# Patient Record
Sex: FEMALE | Race: BLACK OR AFRICAN AMERICAN | Employment: UNEMPLOYED | URBAN - METROPOLITAN AREA
[De-identification: names, ages, dates, MRNs, and addresses within clinical notes are randomized per-mention and may not be internally consistent; named-entity substitution may affect disease eponyms.]

---

## 2023-02-07 ENCOUNTER — PREP FOR PROCEDURE (OUTPATIENT)
Dept: GASTROENTEROLOGY | Age: 58
End: 2023-02-07

## 2023-02-07 RX ORDER — SODIUM CHLORIDE 0.9 % (FLUSH) 0.9 %
5-40 SYRINGE (ML) INJECTION EVERY 12 HOURS SCHEDULED
Status: CANCELLED | OUTPATIENT
Start: 2023-02-07

## 2023-02-07 RX ORDER — SODIUM CHLORIDE 9 MG/ML
25 INJECTION, SOLUTION INTRAVENOUS PRN
Status: CANCELLED | OUTPATIENT
Start: 2023-02-07

## 2023-02-07 RX ORDER — SODIUM CHLORIDE 0.9 % (FLUSH) 0.9 %
5-40 SYRINGE (ML) INJECTION PRN
Status: CANCELLED | OUTPATIENT
Start: 2023-02-07

## 2023-03-09 PROBLEM — Z12.11 ENCOUNTER FOR SCREENING COLONOSCOPY: Status: RESOLVED | Noted: 2023-02-07 | Resolved: 2023-03-09

## 2023-03-13 NOTE — PRE-PROCEDURE INSTRUCTIONS
Patient verified name, , and procedure. Type: 1a; abbreviated assessment per anesthesia guidelines    Labs per anesthesia: none    Instructed pt that they will be notified the day before their procedure by the GI Lab for time of arrival if their procedure is [facility] and Pre-op for [place] cases. Arrival times should be called by 5 pm. If no phone is received the patient should contact their respective hospital. The GI lab telephone number is [phone] and ES Pre-op is [phone]. Follow diet and prep instructions per office including NPO status. If patient has NOT received instructions from office patient is advised to call surgeon office, verbalizes understanding. Bath or shower the night before and the am of surgery with non-moisturizing soap. No lotions, oils, powders, cologne on skin. No make up, eye make up or jewelry. Wear loose fitting comfortable, clean clothing. Must have adult present in building the entire time . Medication instructions given per Dr. Jamilah Morris for the day of procedure. The following discharge instructions reviewed with patient: medication given during procedure may cause drowsiness for several hours, therefore, do not drive or operate machinery for remainder of the day. You may not drink alcohol on the day of your procedure, please resume regular diet and activity unless otherwise directed. You may experience abdominal distention for several hours that is relieved by the passage of gas. Contact your physician if you have any of the following: fever or chills, severe abdominal pain or excessive amount of bleeding or a large amount when having a bowel movement.  Occasional specks of blood with bowel movement would not be unusual.

## 2023-03-24 NOTE — PROGRESS NOTES
Left generic message on non self identifying voicemail for patient to return call to the GI lab. [phone]

## 2023-03-27 ENCOUNTER — HOSPITAL ENCOUNTER (OUTPATIENT)
Age: 58
Setting detail: OUTPATIENT SURGERY
Discharge: HOME | End: 2023-03-27
Attending: INTERNAL MEDICINE | Admitting: INTERNAL MEDICINE
Payer: MEDICAID

## 2023-03-27 ENCOUNTER — ANESTHESIA EVENT (OUTPATIENT)
Dept: ENDOSCOPY | Age: 58
End: 2023-03-27

## 2023-03-27 ENCOUNTER — ANESTHESIA (OUTPATIENT)
Dept: ENDOSCOPY | Age: 58
End: 2023-03-27

## 2023-03-27 VITALS
HEART RATE: 53 BPM | TEMPERATURE: 98 F | BODY MASS INDEX: 44.16 KG/M2 | WEIGHT: 240 LBS | RESPIRATION RATE: 16 BRPM | OXYGEN SATURATION: 98 % | SYSTOLIC BLOOD PRESSURE: 120 MMHG | HEIGHT: 62 IN | DIASTOLIC BLOOD PRESSURE: 65 MMHG

## 2023-03-27 DIAGNOSIS — Z12.11 ENCOUNTER FOR SCREENING COLONOSCOPY: ICD-10-CM

## 2023-03-27 PROCEDURE — 3700000001 HC ADD 15 MINUTES (ANESTHESIA): Performed by: INTERNAL MEDICINE

## 2023-03-27 PROCEDURE — 3609010600 HC COLONOSCOPY POLYPECTOMY SNARE/COLD BIOPSY: Performed by: INTERNAL MEDICINE

## 2023-03-27 PROCEDURE — 88305 TISSUE EXAM BY PATHOLOGIST: CPT

## 2023-03-27 PROCEDURE — 7100000011 HC PHASE II RECOVERY - ADDTL 15 MIN: Performed by: INTERNAL MEDICINE

## 2023-03-27 PROCEDURE — 2580000003 HC RX 258: Performed by: ANESTHESIOLOGY

## 2023-03-27 PROCEDURE — 3700000000 HC ANESTHESIA ATTENDED CARE: Performed by: INTERNAL MEDICINE

## 2023-03-27 PROCEDURE — 2709999900 HC NON-CHARGEABLE SUPPLY: Performed by: INTERNAL MEDICINE

## 2023-03-27 PROCEDURE — 6360000002 HC RX W HCPCS: Performed by: NURSE ANESTHETIST, CERTIFIED REGISTERED

## 2023-03-27 PROCEDURE — 7100000010 HC PHASE II RECOVERY - FIRST 15 MIN: Performed by: INTERNAL MEDICINE

## 2023-03-27 PROCEDURE — 45385 COLONOSCOPY W/LESION REMOVAL: CPT | Performed by: INTERNAL MEDICINE

## 2023-03-27 RX ORDER — LIDOCAINE HYDROCHLORIDE 10 MG/ML
1 INJECTION, SOLUTION INFILTRATION; PERINEURAL
Status: DISCONTINUED | OUTPATIENT
Start: 2023-03-27 | End: 2023-03-27 | Stop reason: HOSPADM

## 2023-03-27 RX ORDER — SODIUM CHLORIDE 9 MG/ML
INJECTION, SOLUTION INTRAVENOUS PRN
Status: DISCONTINUED | OUTPATIENT
Start: 2023-03-27 | End: 2023-03-27 | Stop reason: HOSPADM

## 2023-03-27 RX ORDER — SODIUM CHLORIDE, SODIUM LACTATE, POTASSIUM CHLORIDE, CALCIUM CHLORIDE 600; 310; 30; 20 MG/100ML; MG/100ML; MG/100ML; MG/100ML
INJECTION, SOLUTION INTRAVENOUS CONTINUOUS
Status: DISCONTINUED | OUTPATIENT
Start: 2023-03-27 | End: 2023-03-27 | Stop reason: HOSPADM

## 2023-03-27 RX ORDER — SODIUM CHLORIDE 0.9 % (FLUSH) 0.9 %
5-40 SYRINGE (ML) INJECTION EVERY 12 HOURS SCHEDULED
Status: DISCONTINUED | OUTPATIENT
Start: 2023-03-27 | End: 2023-03-27 | Stop reason: HOSPADM

## 2023-03-27 RX ORDER — PROPOFOL 10 MG/ML
INJECTION, EMULSION INTRAVENOUS PRN
Status: DISCONTINUED | OUTPATIENT
Start: 2023-03-27 | End: 2023-03-27 | Stop reason: SDUPTHER

## 2023-03-27 RX ORDER — SODIUM CHLORIDE 0.9 % (FLUSH) 0.9 %
5-40 SYRINGE (ML) INJECTION PRN
Status: DISCONTINUED | OUTPATIENT
Start: 2023-03-27 | End: 2023-03-27 | Stop reason: HOSPADM

## 2023-03-27 RX ADMIN — PROPOFOL 25 MG: 10 INJECTION, EMULSION INTRAVENOUS at 08:19

## 2023-03-27 RX ADMIN — PROPOFOL 50 MG: 10 INJECTION, EMULSION INTRAVENOUS at 08:12

## 2023-03-27 RX ADMIN — SODIUM CHLORIDE, SODIUM LACTATE, POTASSIUM CHLORIDE, AND CALCIUM CHLORIDE: 600; 310; 30; 20 INJECTION, SOLUTION INTRAVENOUS at 07:09

## 2023-03-27 RX ADMIN — PROPOFOL 140 MCG/KG/MIN: 10 INJECTION, EMULSION INTRAVENOUS at 08:13

## 2023-03-27 RX ADMIN — PROPOFOL 25 MG: 10 INJECTION, EMULSION INTRAVENOUS at 08:16

## 2023-03-27 SDOH — ECONOMIC STABILITY: INCOME INSECURITY: HOW HARD IS IT FOR YOU TO PAY FOR THE VERY BASICS LIKE FOOD, HOUSING, MEDICAL CARE, AND HEATING?: NOT HARD AT ALL

## 2023-03-27 SDOH — ECONOMIC STABILITY: FOOD INSECURITY: WITHIN THE PAST 12 MONTHS, THE FOOD YOU BOUGHT JUST DIDN'T LAST AND YOU DIDN'T HAVE MONEY TO GET MORE.: NEVER TRUE

## 2023-03-27 SDOH — ECONOMIC STABILITY: TRANSPORTATION INSECURITY
IN THE PAST 12 MONTHS, HAS LACK OF TRANSPORTATION KEPT YOU FROM MEETINGS, WORK, OR FROM GETTING THINGS NEEDED FOR DAILY LIVING?: NO

## 2023-03-27 SDOH — ECONOMIC STABILITY: HOUSING INSECURITY
IN THE LAST 12 MONTHS, WAS THERE A TIME WHEN YOU DID NOT HAVE A STEADY PLACE TO SLEEP OR SLEPT IN A SHELTER (INCLUDING NOW)?: NO

## 2023-03-27 SDOH — ECONOMIC STABILITY: FOOD INSECURITY: WITHIN THE PAST 12 MONTHS, YOU WORRIED THAT YOUR FOOD WOULD RUN OUT BEFORE YOU GOT MONEY TO BUY MORE.: NEVER TRUE

## 2023-03-27 ASSESSMENT — ENCOUNTER SYMPTOMS
SHORTNESS OF BREATH: 0
VOMITING: 0
TROUBLE SWALLOWING: 0
COLOR CHANGE: 0
ABDOMINAL PAIN: 0
BLOOD IN STOOL: 0

## 2023-03-27 ASSESSMENT — PAIN - FUNCTIONAL ASSESSMENT: PAIN_FUNCTIONAL_ASSESSMENT: NONE - DENIES PAIN

## 2023-03-27 NOTE — H&P
No focal deficit present. Mental Status: She is alert. Current Facility-Administered Medications   Medication Dose Route Frequency Provider Last Rate Last Admin    lidocaine 1 % injection 1 mL  1 mL IntraDERmal Once PRN Matilde Figueroa MD        lactated ringers IV soln infusion   IntraVENous Continuous Matilde Figueroa MD        sodium chloride flush 0.9 % injection 5-40 mL  5-40 mL IntraVENous 2 times per day Matilde Figueroa MD        sodium chloride flush 0.9 % injection 5-40 mL  5-40 mL IntraVENous PRN Matilde Figueroa MD        0.9 % sodium chloride infusion   IntraVENous PRN Matilde Figueroa MD           Family History   Problem Relation Age of Onset    Hypertension Mother     No Known Problems Father     Hypertension Sister     Hypertension Brother        No follow-ups on file. An electronic signature was used to authenticate this note.     --Melinda Griffin MD

## 2023-03-27 NOTE — DISCHARGE INSTRUCTIONS
Gastrointestinal Colonoscopy/Flexible Sigmoidoscopy - Lower Exam Discharge Instructions  Call Dr. Ron Hearn at [phone] for any problems or questions. Contact the doctors office for follow up appointment as directed  Medication may cause drowsiness for several hours, therefore:  Do not drive or operate machinery for reminder of the day. No alcohol today. Do not make any important or legal decisions for 24 hours. Do not sign any legal documents for 24 hours. Ordinarily, you may resume regular diet and activity after exam unless otherwise specified by your physician. Because of air put into your colon during exam, you may experience some abdominal distension, relieved by the passage of gas, for several hours. Contact your physician if you have any of the following:  Excessive amount of bleeding - large amount when having a bowel movement. Occasional specks of blood with bowel movement would not be unusual.  Severe abdominal pain  Fever or Chills  Any additional instructions: Follow up with office for pathology results. Office will determine next colonoscopy schedule based on path results.

## 2023-03-27 NOTE — PROGRESS NOTES
Discharge instructions were reviewed with patient and friend. An opportunity was given for questions. Patient and friend verbalized understanding, and has no questions at this time.

## 2023-03-27 NOTE — ANESTHESIA POSTPROCEDURE EVALUATION
Department of Anesthesiology  Postprocedure Note    Patient: Antonio Jacobs  MRN: [id number]  YOB: 1965  Date of evaluation: 3/27/2023      Procedure Summary     Date: 03/27/23 Room / Location: [facility] ENDO 04 / [facility] ENDOSCOPY    Anesthesia Start: 0801 Anesthesia Stop: 5238    Procedure: COLONOSCOPY/ POLYPECTOMY (Lower GI Region) Diagnosis:       Encounter for screening colonoscopy      (Encounter for screening colonoscopy [Z12.11])    Surgeons: Faustino Jaquez MD Responsible Provider: Zakiya Manuel MD    Anesthesia Type: TIVA ASA Status: 3          Anesthesia Type: No value filed.     Claudine Phase I: Claudine Score: 10    Claudine Phase II: Claudine Score: 8      Anesthesia Post Evaluation    Patient location during evaluation: PACU  Patient participation: complete - patient participated  Level of consciousness: awake and alert  Airway patency: patent  Nausea & Vomiting: no nausea and no vomiting  Complications: no  Cardiovascular status: hemodynamically stable  Respiratory status: acceptable, nonlabored ventilation and spontaneous ventilation  Hydration status: euvolemic  Comments: BP (!) 114/55   Pulse 80   Temp 98 °F (36.7 °C) (Skin)   Resp 16   Ht 5' 2\" (1.575 m)   Wt 240 lb (108.9 kg)   SpO2 97%   BMI 43.90 kg/m²     Multimodal analgesia pain management approach

## 2023-03-27 NOTE — PROCEDURES
Operative Report    Patient: Pretty Ann MRN: [id number]      YOB: 1965  Age: 66 y.o. Sex: female            Indications:  screening for colon cancer [unfilled]     Preoperative Evaluation: The patient was evaluated prior to the procedure in the GI lab admission area, the patient ASA was recorded . Consent was obtained from the patient with the risk of perforation bleeding and aspiration. Anesthesia: PEEWEE-per anesthesia    Complications: None; patient tolerated the procedure well. EBL -insignificant      Procedure: The patient was sedated in the left lateral decubitus position. Scope was advanced from the rectum to the cecum. Evaluation was performed to the cecum twice. The scope was withdrawn to the rectum, retroflexed view was performed. The rectal exam was normal.  Preparation was good Harrison score of 3/3/3:9 . Findings:   Exam to the cecum. 2 passes performed into the right colon. Normal cecum ascending transverse descending mucosa 5 mm sigmoid polyp that was cold snare. Normal rectal mucosa. External hemorrhoid noted      Postoperative Diagnosis: Sigmoid polyp    [address]26 Colonoscopy, Flexible; with removal of tumor(s), polyp(s), or other lesion(s) by snare technique      Recommendations:   - If adenoma is present, repeat colonoscopy in 5 years.       Signed By:  Odette Caraballo MD     March 27, 2023

## 2023-03-27 NOTE — ANESTHESIA PRE PROCEDURE
12:11 PM    PROT 7.1 12/15/2022 12:11 PM    CALCIUM 9.3 12/15/2022 12:11 PM    BILITOT 0.6 12/15/2022 12:11 PM    ALKPHOS 62 12/15/2022 12:11 PM    AST 11 12/15/2022 12:11 PM    ALT 25 12/15/2022 12:11 PM       POC Tests: No results for input(s): POCGLU, POCNA, POCK, POCCL, POCBUN, POCHEMO, POCHCT in the last 72 hours. Coags: No results found for: PROTIME, INR, APTT    HCG (If Applicable): No results found for: PREGTESTUR, PREGSERUM, HCG, HCGQUANT     ABGs: No results found for: PHART, PO2ART, LQF1PNT, BTQ4IIA, BEART, A0JODTIF     Type & Screen (If Applicable):  No results found for: LABABO, LABRH    Drug/Infectious Status (If Applicable):  Lab Results   Component Value Date/Time    HEPCAB NONREACTIVE 12/15/2022 12:11 PM       COVID-19 Screening (If Applicable): No results found for: COVID19        Anesthesia Evaluation    Airway: Mallampati: III  TM distance: <3 FB   Neck ROM: full  Comment: Short thick neck  Mouth opening: > = 3 FB   Dental: normal exam         Pulmonary:normal exam  breath sounds clear to auscultation  (+) sleep apnea: on CPAP,  asthma (environmental exposure related.):                            Cardiovascular:  Exercise tolerance: good (>4 METS), At about 4 METs. (+) hypertension:,         Rhythm: regular  Rate: normal                    Neuro/Psych:               GI/Hepatic/Renal:   (+) morbid obesity          Endo/Other:    (+) blood dyscrasia (sickle cell trait)::., .                 Abdominal:             Vascular: Other Findings:           Anesthesia Plan      TIVA     ASA 3       Induction: intravenous. Anesthetic plan and risks discussed with patient.                         Morenita Nielsen MD   3/27/2023

## 2023-03-28 ENCOUNTER — OFFICE VISIT (OUTPATIENT)
Dept: FAMILY MEDICINE CLINIC | Facility: CLINIC | Age: 58
End: 2023-03-28
Payer: MEDICAID

## 2023-03-28 VITALS
OXYGEN SATURATION: 97 % | DIASTOLIC BLOOD PRESSURE: 64 MMHG | WEIGHT: 242.8 LBS | HEART RATE: 75 BPM | BODY MASS INDEX: 44.68 KG/M2 | SYSTOLIC BLOOD PRESSURE: 96 MMHG | HEIGHT: 62 IN

## 2023-03-28 DIAGNOSIS — R00.2 PALPITATIONS: Primary | ICD-10-CM

## 2023-03-28 LAB
BASOPHILS # BLD: 0 K/UL (ref 0–0.2)
BASOPHILS NFR BLD: 1 % (ref 0–2)
DIFFERENTIAL METHOD BLD: ABNORMAL
EOSINOPHIL # BLD: 0.1 K/UL (ref 0–0.8)
EOSINOPHIL NFR BLD: 2 % (ref 0.5–7.8)
ERYTHROCYTE [DISTWIDTH] IN BLOOD BY AUTOMATED COUNT: 12.5 % (ref 11.9–14.6)
HCT VFR BLD AUTO: 39.1 % (ref 35.8–46.3)
HGB BLD-MCNC: 12.8 G/DL (ref 11.7–15.4)
IMM GRANULOCYTES # BLD AUTO: 0 K/UL (ref 0–0.5)
IMM GRANULOCYTES NFR BLD AUTO: 1 % (ref 0–5)
LYMPHOCYTES # BLD: 1.5 K/UL (ref 0.5–4.6)
LYMPHOCYTES NFR BLD: 25 % (ref 13–44)
MCH RBC QN AUTO: 27 PG (ref 26.1–32.9)
MCHC RBC AUTO-ENTMCNC: 32.7 G/DL (ref 31.4–35)
MCV RBC AUTO: 82.5 FL (ref 82–102)
MONOCYTES # BLD: 0.3 K/UL (ref 0.1–1.3)
MONOCYTES NFR BLD: 5 % (ref 4–12)
NEUTS SEG # BLD: 4.1 K/UL (ref 1.7–8.2)
NEUTS SEG NFR BLD: 67 % (ref 43–78)
NRBC # BLD: 0 K/UL (ref 0–0.2)
PLATELET # BLD AUTO: 183 K/UL (ref 150–450)
PMV BLD AUTO: 13.8 FL (ref 9.4–12.3)
RBC # BLD AUTO: 4.74 M/UL (ref 4.05–5.2)
WBC # BLD AUTO: 6.1 K/UL (ref 4.3–11.1)

## 2023-03-28 PROCEDURE — 99213 OFFICE O/P EST LOW 20 MIN: CPT | Performed by: FAMILY MEDICINE

## 2023-03-28 ASSESSMENT — PATIENT HEALTH QUESTIONNAIRE - PHQ9
SUM OF ALL RESPONSES TO PHQ QUESTIONS 1-9: 0
SUM OF ALL RESPONSES TO PHQ QUESTIONS 1-9: 0
1. LITTLE INTEREST OR PLEASURE IN DOING THINGS: 0
SUM OF ALL RESPONSES TO PHQ QUESTIONS 1-9: 0
SUM OF ALL RESPONSES TO PHQ QUESTIONS 1-9: 0
2. FEELING DOWN, DEPRESSED OR HOPELESS: 0
SUM OF ALL RESPONSES TO PHQ9 QUESTIONS 1 & 2: 0

## 2023-03-28 ASSESSMENT — ENCOUNTER SYMPTOMS: SHORTNESS OF BREATH: 0

## 2023-03-28 NOTE — PROGRESS NOTES
PROGRESS NOTE    SUBJECTIVE:   Maren Schaefer is a 66 y.o. female seen for a follow up visit regarding   Chief Complaint   Patient presents with    Palpitations     Complains of intermittent heart palpitations and chest pain x 2 weeks. HPI:  Patient presents reporting that she has been having palpitations for couple of weeks. Provoked by stress and anxiety. She denies caffeine provocation. Denies anginal-like chest pain. Denies runs of tachycardia. Denies shortness of breath. She actually had a colonoscopy yesterday which was remarkable for 1 polyp. In using notes are reviewed, no mention of ectopy or dysrhythmia. Notes document sinus rhythm throughout procedure. However patient reports a history of syncope, unexplained. She wore a loop recorder for 2 years, no dysrhythmia found. Reviewed and updated this visit by provider:  Tobacco  Allergies  Meds  Problems  Med Hx  Surg Hx  Fam Hx           Review of Systems   Respiratory:  Negative for shortness of breath. Cardiovascular:  Positive for palpitations. OBJECTIVE:  Vitals:    03/28/23 1443   BP: 96/64   Site: Left Upper Arm   Cuff Size: Large Adult   Pulse: 75   SpO2: 97%   Weight: 242 lb 12.8 oz (110.1 kg)   Height: 5' 2\" (1.575 m)        Physical Exam   General: Alert and oriented x3, well-appearing  Neck: No adenopathy, thyromegaly or thyroid nodules  Pulmonary: Normal effort, good airflow, no rales or rhonchi  CVS: Regular rate and rhythm, normal S1, S2, no S3 or S4, no murmurs; no carotid bruits, 2+ pedal pulses      Medical problems and test results were reviewed with the patient today. No results found for this or any previous visit (from the past 672 hour(s)). No results found for any visits on 03/28/23. ASSESSMENT and PLAN    1. Palpitations  -     EKG 12 Lead  -     [address] Metabolic Panel;  Future  -     CBC with Auto Differential; Future  -     Lipid Panel;

## 2023-03-29 LAB
ALBUMIN SERPL-MCNC: 3.6 G/DL (ref 3.5–5)
ALBUMIN/GLOB SERPL: 1.2 (ref 0.4–1.6)
ALP SERPL-CCNC: 65 U/L (ref 50–136)
ALT SERPL-CCNC: 26 U/L (ref 12–65)
ANION GAP SERPL CALC-SCNC: 0 MMOL/L (ref 2–11)
AST SERPL-CCNC: 15 U/L (ref 15–37)
BILIRUB DIRECT SERPL-MCNC: 0.1 MG/DL
BILIRUB SERPL-MCNC: 0.5 MG/DL (ref 0.2–1.1)
BUN SERPL-MCNC: 6 MG/DL (ref 6–23)
CALCIUM SERPL-MCNC: 8.4 MG/DL (ref 8.3–10.4)
CHLORIDE SERPL-SCNC: 107 MMOL/L (ref 101–110)
CHOLEST SERPL-MCNC: 218 MG/DL
CO2 SERPL-SCNC: 34 MMOL/L (ref 21–32)
CREAT SERPL-MCNC: 0.8 MG/DL (ref 0.6–1)
GLOBULIN SER CALC-MCNC: 3 G/DL (ref 2.8–4.5)
GLUCOSE SERPL-MCNC: 101 MG/DL (ref 65–100)
HDLC SERPL-MCNC: 50 MG/DL (ref 40–60)
HDLC SERPL: 4.4
LDLC SERPL CALC-MCNC: 136.2 MG/DL
POTASSIUM SERPL-SCNC: 3.4 MMOL/L (ref 3.5–5.1)
PROT SERPL-MCNC: 6.6 G/DL (ref 6.3–8.2)
SODIUM SERPL-SCNC: 141 MMOL/L (ref 133–143)
TRIGL SERPL-MCNC: 159 MG/DL (ref 35–150)
TSH, 3RD GENERATION: 0.35 UIU/ML (ref 0.36–3.74)
VLDLC SERPL CALC-MCNC: 31.8 MG/DL (ref 6–23)

## 2023-04-05 ENCOUNTER — TELEPHONE (OUTPATIENT)
Dept: FAMILY MEDICINE CLINIC | Facility: CLINIC | Age: 58
End: 2023-04-05

## 2023-04-05 DIAGNOSIS — E87.8 ABNORMAL BLOOD ELECTROLYTE LEVEL: ICD-10-CM

## 2023-04-05 DIAGNOSIS — E05.90 HYPERTHYROIDISM: Primary | ICD-10-CM

## 2023-04-05 NOTE — TELEPHONE ENCOUNTER
Per fax from [employer]; needing office notes from 3/28/23 addendum to show patient is compliant with her CPAP machine and benefiting from therapy. Please advise.

## 2023-04-24 ENCOUNTER — OFFICE VISIT (OUTPATIENT)
Dept: FAMILY MEDICINE CLINIC | Facility: CLINIC | Age: 58
End: 2023-04-24
Payer: MEDICAID

## 2023-04-24 VITALS
SYSTOLIC BLOOD PRESSURE: 110 MMHG | HEART RATE: 75 BPM | BODY MASS INDEX: 44.42 KG/M2 | DIASTOLIC BLOOD PRESSURE: 72 MMHG | HEIGHT: 62 IN | OXYGEN SATURATION: 98 % | WEIGHT: 241.4 LBS

## 2023-04-24 DIAGNOSIS — E78.00 HYPERCHOLESTEROLEMIA: ICD-10-CM

## 2023-04-24 DIAGNOSIS — I10 PRIMARY HYPERTENSION: Primary | ICD-10-CM

## 2023-04-24 PROCEDURE — 99213 OFFICE O/P EST LOW 20 MIN: CPT | Performed by: FAMILY MEDICINE

## 2023-04-24 PROCEDURE — 3078F DIAST BP <80 MM HG: CPT | Performed by: FAMILY MEDICINE

## 2023-04-24 PROCEDURE — 3074F SYST BP LT 130 MM HG: CPT | Performed by: FAMILY MEDICINE

## 2023-04-24 ASSESSMENT — PATIENT HEALTH QUESTIONNAIRE - PHQ9
SUM OF ALL RESPONSES TO PHQ QUESTIONS 1-9: 0
2. FEELING DOWN, DEPRESSED OR HOPELESS: 0
SUM OF ALL RESPONSES TO PHQ QUESTIONS 1-9: 0
SUM OF ALL RESPONSES TO PHQ9 QUESTIONS 1 & 2: 0
1. LITTLE INTEREST OR PLEASURE IN DOING THINGS: 0

## 2023-04-24 ASSESSMENT — ENCOUNTER SYMPTOMS: RESPIRATORY NEGATIVE: 1

## 2023-04-24 NOTE — PROGRESS NOTES
PROGRESS NOTE    SUBJECTIVE:   Yady Fontaine is a 66 y.o. female seen for a follow up visit regarding   Chief Complaint   Patient presents with    Results     Here to discuss lab results        HPI:  Here for follow-up of hypertension and to review labs. She is doing well presently. Reviewed and updated this visit by provider:  Tobacco  Allergies  Meds  Problems  Med Hx  Surg Hx  Fam Hx           Review of Systems   Respiratory: Negative. Cardiovascular: Negative. OBJECTIVE:  Vitals:    04/24/23 1433   BP: 110/72   Site: Left Upper Arm   Cuff Size: Large Adult   Pulse: 75   SpO2: 98%   Weight: 241 lb 6.4 oz (109.5 kg)   Height: 5' 2\" (1.575 m)        Physical Exam   General: Alert and oriented x3, well-appearing  Neck: No adenopathy, thyromegaly or thyroid nodules  Pulmonary: Normal effort, good airflow, no rales or rhonchi  CVS: Regular rate and rhythm, normal S1, S2, no S3 or S4, no murmurs; no carotid bruits, 2+ pedal pulses      Medical problems and test results were reviewed with the patient today.      Recent Results (from the past 672 hour(s))   Basic Metabolic Panel    Collection Time: 03/28/23  3:26 PM   Result Value Ref Range    Sodium 141 133 - 143 mmol/L    Potassium 3.4 (L) 3.5 - 5.1 mmol/L    Chloride 107 101 - 110 mmol/L    CO2 34 (H) 21 - 32 mmol/L    Anion Gap 0 (L) 2 - 11 mmol/L    Glucose 101 (H) 65 - 100 mg/dL    BUN 6 6 - 23 MG/DL    Creatinine 0.80 0.6 - 1.0 MG/DL    Est, Glom Filt Rate >60 >60 ml/min/1.73m2    Calcium 8.4 8.3 - 10.4 MG/DL   CBC with Auto Differential    Collection Time: 03/28/23  3:26 PM   Result Value Ref Range    WBC 6.1 4.3 - 11.1 K/uL    RBC 4.74 4.05 - 5.2 M/uL    Hemoglobin 12.8 11.7 - 15.4 g/dL    Hematocrit 39.1 35.8 - 46.3 %    MCV 82.5 82 - 102 FL    MCH 27.0 26.1 - 32.9 PG    MCHC 32.7 31.4 - 35.0 g/dL    RDW 12.5 11.9 - 14.6 %    Platelets 847 986 - 080 K/uL    MPV 13.8 (H) 9.4 - 12.3 FL    nRBC 0.00 0.0 - 0.2 K/uL    Differential

## 2023-04-26 PROBLEM — Z12.11 ENCOUNTER FOR SCREENING COLONOSCOPY: Status: RESOLVED | Noted: 2023-03-03 | Resolved: 2023-04-26

## 2023-05-01 ENCOUNTER — INITIAL CONSULT (OUTPATIENT)
Dept: CARDIOLOGY CLINIC | Age: 58
End: 2023-05-01
Payer: MEDICAID

## 2023-05-01 VITALS
BODY MASS INDEX: 43.79 KG/M2 | HEART RATE: 72 BPM | DIASTOLIC BLOOD PRESSURE: 76 MMHG | WEIGHT: 238 LBS | HEIGHT: 62 IN | SYSTOLIC BLOOD PRESSURE: 122 MMHG

## 2023-05-01 DIAGNOSIS — R00.2 PALPITATIONS: Primary | ICD-10-CM

## 2023-05-01 DIAGNOSIS — J45.20 INTERMITTENT ASTHMA WITHOUT COMPLICATION, UNSPECIFIED ASTHMA SEVERITY: ICD-10-CM

## 2023-05-01 PROCEDURE — 99204 OFFICE O/P NEW MOD 45 MIN: CPT | Performed by: INTERNAL MEDICINE

## 2023-05-01 NOTE — PROGRESS NOTES
[facility] CARDIOLOGY  [address] [facility], [address]  PHONE: [phone]          NAME:  Haresh Mullins  : 1965  MRN: [id number]         SUBJECTIVE:   Haresh Mullins is a 66 y.o. female seen for a consultation visit regarding the following:     Chief Complaint   Patient presents with    Consultation    Palpitations            HPI:  Consultation is requested by Boubacar Hall MD for evaluation of Consultation and Palpitations   . Ms. Stefif Rowley presents today for follow-up. She presents today for evaluation of palpitations. She has been going on for about 3 months, seems to be improving the last couple of weeks. She notes this happen more during periods of stress or anxiety. They typically last a few minutes and then rosemary spontaneously. She describes this as a sensation of elevated heart rate. She denies any skipping or irregularity. She has had no syncopal episodes. She has no personal history of heart disease. She denies any significant family history of heart disease. She is a non-smoker. She drinks some caffeine, she does note occasional energy drink use. She denies orthopnea, PND, palpitations, syncope, lower extremity swelling or chest pain. She denies any recent hospitalizations. Palpitations       Key CAD CHF Meds            triamterene-hydroCHLOROthiazide (MAXZIDE) 75-50 MG per tablet (Taking)    Sig: TAKE 1 TABLET BY MOUTH ONCE DAILY    NIFEdipine (PROCARDIA) 20 MG capsule (Taking)    Class: Historical Med          Key Antihyperglycemic Medications       Patient is on no antihyperglycemic meds. Past Medical History, Past Surgical History, Family history, Social History, and Medications were all reviewed with the patient today and updated as necessary. Prior to Admission medications    Medication Sig Start Date End Date Taking?  Authorizing Provider   triamterene-hydroCHLOROthiazide (MAXZIDE) 75-50 MG per tablet TAKE 1

## 2023-06-14 DIAGNOSIS — A09 DIARRHEA OF INFECTIOUS ORIGIN: ICD-10-CM

## 2023-06-17 LAB

## 2023-06-29 DIAGNOSIS — R82.998 LEUKOCYTES IN URINE: Primary | ICD-10-CM

## 2023-07-10 ENCOUNTER — NURSE ONLY (OUTPATIENT)
Dept: FAMILY MEDICINE CLINIC | Facility: CLINIC | Age: 58
End: 2023-07-10

## 2023-07-10 DIAGNOSIS — R82.998 LEUKOCYTES IN URINE: ICD-10-CM

## 2023-07-10 LAB
BACTERIA URNS QL MICRO: ABNORMAL /HPF
CASTS URNS QL MICRO: 0 /LPF
CRYSTALS URNS QL MICRO: 0 /LPF
EPI CELLS #/AREA URNS HPF: ABNORMAL /HPF
MUCOUS THREADS URNS QL MICRO: 0 /LPF
OTHER OBSERVATIONS: ABNORMAL
RBC #/AREA URNS HPF: ABNORMAL /HPF
WBC URNS QL MICRO: ABNORMAL /HPF

## 2023-07-12 LAB
BACTERIA SPEC CULT: NORMAL
BACTERIA SPEC CULT: NORMAL
SERVICE CMNT-IMP: NORMAL

## 2023-07-24 ENCOUNTER — TELEPHONE (OUTPATIENT)
Dept: FAMILY MEDICINE CLINIC | Facility: CLINIC | Age: 58
End: 2023-07-24

## 2023-08-01 ENCOUNTER — OFFICE VISIT (OUTPATIENT)
Age: 58
End: 2023-08-01
Payer: MEDICAID

## 2023-08-01 VITALS
HEART RATE: 70 BPM | HEIGHT: 62 IN | SYSTOLIC BLOOD PRESSURE: 110 MMHG | WEIGHT: 239 LBS | DIASTOLIC BLOOD PRESSURE: 68 MMHG | BODY MASS INDEX: 43.98 KG/M2

## 2023-08-01 DIAGNOSIS — J45.20 INTERMITTENT ASTHMA WITHOUT COMPLICATION, UNSPECIFIED ASTHMA SEVERITY: ICD-10-CM

## 2023-08-01 DIAGNOSIS — R00.2 PALPITATIONS: Primary | ICD-10-CM

## 2023-08-01 PROCEDURE — 99214 OFFICE O/P EST MOD 30 MIN: CPT | Performed by: INTERNAL MEDICINE

## 2023-08-01 NOTE — PROGRESS NOTES
normal.       Medical problems and test results were reviewed with the patient today. DATA REVIEW    LIPID PANEL     Lab Results   Component Value Date    CHOL 218 (H) 03/28/2023    CHOL 238 (H) 12/15/2022     Lab Results   Component Value Date    TRIG 159 (H) 03/28/2023    TRIG 111 12/15/2022     Lab Results   Component Value Date    HDL 50 03/28/2023    HDL 54 12/15/2022     Lab Results   Component Value Date    LDLCALC 136.2 (H) 03/28/2023    LDLCALC 161.8 (H) 12/15/2022     Lab Results   Component Value Date    LABVLDL 31.8 (H) 03/28/2023    LABVLDL 22.2 12/15/2022     Lab Results   Component Value Date    CHOLHDLRATIO 4.4 03/28/2023    CHOLHDLRATIO 4.4 12/15/2022       BMP  Lab Results   Component Value Date/Time     06/13/2023 09:59 AM    K 3.5 06/13/2023 09:59 AM     06/13/2023 09:59 AM    CO2 32 06/13/2023 09:59 AM    BUN 16 06/13/2023 09:59 AM    CREATININE 1.10 06/13/2023 09:59 AM    GLUCOSE 106 06/13/2023 09:59 AM    CALCIUM 9.3 06/13/2023 09:59 AM          EKG        CXR/IMAGING        DEVICE INTERROGATION        OUTSIDE RECORDS REVIEW    Records from outside providers have been reviewed and summarized as noted in the HPI, past history and data review sections of this note, and reviewed with patient. .       ASSESSMENT and Lucia Carlin was seen today for follow-up and palpitations. Diagnoses and all orders for this visit:    Palpitations    Intermittent asthma without complication, unspecified asthma severity          IMPRESSION:      Ms. Reyna Mckeon presents today for follow-up. Palpitations have resolved since she stopped taking fish oil. She has no other significant cardiac symptoms at this time. We discussed doing a monitor previously, as her symptoms have improved we will hold off on this.-See patient back on      No follow-ups on file. Thank you for allowing me to participate in this patient's care.   Please call or contact me if there are any questions or concerns

## 2023-08-11 ASSESSMENT — PATIENT HEALTH QUESTIONNAIRE - PHQ9
1. LITTLE INTEREST OR PLEASURE IN DOING THINGS: NOT AT ALL
SUM OF ALL RESPONSES TO PHQ9 QUESTIONS 1 & 2: 0
SUM OF ALL RESPONSES TO PHQ QUESTIONS 1-9: 0
1. LITTLE INTEREST OR PLEASURE IN DOING THINGS: 0
SUM OF ALL RESPONSES TO PHQ QUESTIONS 1-9: 0
SUM OF ALL RESPONSES TO PHQ QUESTIONS 1-9: 0
2. FEELING DOWN, DEPRESSED OR HOPELESS: NOT AT ALL
SUM OF ALL RESPONSES TO PHQ9 QUESTIONS 1 & 2: 0
2. FEELING DOWN, DEPRESSED OR HOPELESS: 0
SUM OF ALL RESPONSES TO PHQ QUESTIONS 1-9: 0

## 2023-08-14 ENCOUNTER — OFFICE VISIT (OUTPATIENT)
Dept: FAMILY MEDICINE CLINIC | Facility: CLINIC | Age: 58
End: 2023-08-14
Payer: MEDICAID

## 2023-08-14 VITALS
BODY MASS INDEX: 44.05 KG/M2 | HEART RATE: 60 BPM | WEIGHT: 239.4 LBS | SYSTOLIC BLOOD PRESSURE: 116 MMHG | DIASTOLIC BLOOD PRESSURE: 78 MMHG | OXYGEN SATURATION: 98 % | HEIGHT: 62 IN

## 2023-08-14 DIAGNOSIS — I10 PRIMARY HYPERTENSION: Primary | ICD-10-CM

## 2023-08-14 DIAGNOSIS — Z91.038 ALLERGIC TO INSECT BITES: ICD-10-CM

## 2023-08-14 DIAGNOSIS — Z00.00 HEALTHCARE MAINTENANCE: ICD-10-CM

## 2023-08-14 PROCEDURE — 3078F DIAST BP <80 MM HG: CPT | Performed by: FAMILY MEDICINE

## 2023-08-14 PROCEDURE — 99213 OFFICE O/P EST LOW 20 MIN: CPT | Performed by: FAMILY MEDICINE

## 2023-08-14 PROCEDURE — 3074F SYST BP LT 130 MM HG: CPT | Performed by: FAMILY MEDICINE

## 2023-08-14 RX ORDER — TRIAMTERENE AND HYDROCHLOROTHIAZIDE 75; 50 MG/1; MG/1
TABLET ORAL
Qty: 90 TABLET | Refills: 1 | Status: SHIPPED | OUTPATIENT
Start: 2023-08-14

## 2023-08-14 RX ORDER — TRIAMCINOLONE ACETONIDE 5 MG/G
CREAM TOPICAL
Qty: 30 G | Refills: 3 | Status: SHIPPED | OUTPATIENT
Start: 2023-08-14 | End: 2023-08-21

## 2023-08-14 ASSESSMENT — ENCOUNTER SYMPTOMS
GASTROINTESTINAL NEGATIVE: 1
ALLERGIC/IMMUNOLOGIC NEGATIVE: 1
RESPIRATORY NEGATIVE: 1
EYES NEGATIVE: 1

## 2023-08-14 NOTE — PROGRESS NOTES
Negative. Endocrine: Negative. Genitourinary: Negative. Musculoskeletal: Negative. Skin:  Positive for rash. Allergic/Immunologic: Negative. Neurological: Negative. Hematological: Negative. Psychiatric/Behavioral: Negative. Objective   Physical Exam  Constitutional:       Appearance: Normal appearance. She is obese. Cardiovascular:      Rate and Rhythm: Normal rate and regular rhythm. Pulses: Normal pulses. Heart sounds: Normal heart sounds. Pulmonary:      Breath sounds: Normal breath sounds. Skin:     Comments: R thigh insect bite erythema and warmth. Neurological:      General: No focal deficit present. Mental Status: She is alert and oriented to person, place, and time. Psychiatric:         Mood and Affect: Mood normal.         Behavior: Behavior normal.         Thought Content: Thought content normal.         Judgment: Judgment normal.              An electronic signature was used to authenticate this note.     --Socrates Armenta

## 2023-08-22 ENCOUNTER — TRANSCRIBE ORDERS (OUTPATIENT)
Dept: SCHEDULING | Age: 58
End: 2023-08-22

## 2023-08-22 DIAGNOSIS — Z12.31 SCREENING MAMMOGRAM FOR HIGH-RISK PATIENT: Primary | ICD-10-CM

## 2023-08-30 ENCOUNTER — OFFICE VISIT (OUTPATIENT)
Dept: FAMILY MEDICINE CLINIC | Facility: CLINIC | Age: 58
End: 2023-08-30
Payer: MEDICAID

## 2023-08-30 VITALS
OXYGEN SATURATION: 98 % | HEIGHT: 62 IN | HEART RATE: 72 BPM | SYSTOLIC BLOOD PRESSURE: 110 MMHG | WEIGHT: 238 LBS | BODY MASS INDEX: 43.79 KG/M2 | DIASTOLIC BLOOD PRESSURE: 84 MMHG

## 2023-08-30 DIAGNOSIS — N64.52 BREAST DISCHARGE: Primary | ICD-10-CM

## 2023-08-30 PROCEDURE — 99213 OFFICE O/P EST LOW 20 MIN: CPT | Performed by: FAMILY MEDICINE

## 2023-08-30 ASSESSMENT — PATIENT HEALTH QUESTIONNAIRE - PHQ9
SUM OF ALL RESPONSES TO PHQ QUESTIONS 1-9: 0
1. LITTLE INTEREST OR PLEASURE IN DOING THINGS: 0
SUM OF ALL RESPONSES TO PHQ9 QUESTIONS 1 & 2: 0
SUM OF ALL RESPONSES TO PHQ QUESTIONS 1-9: 0
2. FEELING DOWN, DEPRESSED OR HOPELESS: 0

## 2023-08-30 ASSESSMENT — ENCOUNTER SYMPTOMS: RESPIRATORY NEGATIVE: 1

## 2023-08-30 NOTE — PROGRESS NOTES
PROGRESS NOTE    SUBJECTIVE:   Beverley Gonzalez is a 66 y.o. female seen for a follow up visit regarding   Chief Complaint   Patient presents with    Nipple Problem     Complains of discharge coming from left breast x 20 years off and on; noticed discharge about a week ago        HPI:  Here for follow-up reporting that she has developed some left breast discharge from her nipple. She has had this off-and-on for many years, stating she has had a biopsy in the past which was benign, does not know the exact pathology that was found at the time. Does not recall being told this was a papilloma. Reviewed and updated this visit by provider:  Tobacco  Allergies  Meds  Problems  Med Hx  Surg Hx  Fam Hx           Review of Systems   Respiratory: Negative. Cardiovascular: Negative. OBJECTIVE:  Vitals:    08/30/23 1455   BP: 110/84   Site: Left Upper Arm   Cuff Size: Large Adult   Pulse: 72   SpO2: 98%   Weight: 238 lb (108 kg)   Height: 5' 2\" (1.575 m)        Physical Exam   Breast exam today reveals normal-appearing breast without asymmetry. No dimpling. There is no areolar or subareolar mass bilaterally. I am not able to express any discharge today. There is no axillary adenopathy or mass. Medical problems and test results were reviewed with the patient today. No results found for this or any previous visit (from the past 672 hour(s)). No results found for any visits on 08/30/23. ASSESSMENT and PLAN    1. Breast discharge  -     CARLOS DIGITAL DIAGNOSTIC W OR WO CAD BILATERAL; Future  -     US BREAST COMPLETE LEFT; Future       No follow-ups on file.        Ya Frnacisco MD

## 2023-09-07 ENCOUNTER — HOSPITAL ENCOUNTER (OUTPATIENT)
Dept: MAMMOGRAPHY | Age: 58
Discharge: HOME | End: 2023-09-07
Attending: FAMILY MEDICINE
Payer: MEDICAID

## 2023-09-07 ENCOUNTER — HOSPITAL ENCOUNTER (OUTPATIENT)
Dept: MAMMOGRAPHY | Age: 58
End: 2023-09-07
Attending: FAMILY MEDICINE
Payer: MEDICAID

## 2023-09-07 DIAGNOSIS — N64.52 BREAST DISCHARGE: ICD-10-CM

## 2023-09-07 PROCEDURE — 77066 DX MAMMO INCL CAD BI: CPT

## 2023-09-07 PROCEDURE — 76642 ULTRASOUND BREAST LIMITED: CPT

## 2023-10-11 ENCOUNTER — TELEPHONE (OUTPATIENT)
Dept: FAMILY MEDICINE CLINIC | Facility: CLINIC | Age: 58
End: 2023-10-11

## 2023-10-11 NOTE — TELEPHONE ENCOUNTER
Patient contacted. Reports driving home from airport. Denies any current symptoms. States she will not be home until after 5 PM today. Scheduled appointment for tomorrow at 11 AM. Patient voiced thanks.

## 2023-10-11 NOTE — TELEPHONE ENCOUNTER
----- Message from Sherri Reyes sent at 10/11/2023 11:16 AM EDT -----  Subject: Appointment Request    Reason for Call: Established Patient Appointment needed: Routine Existing   Condition Follow Up    QUESTIONS    Reason for appointment request? Available appointments did not meet   patient need     Additional Information for Provider? [place]   [place]. During this flight her bp dropped to 64/?? and she was extremely   tired. Medics gave her oxygen for 3 hrs. She has also been wheezing since   that incident. Her bp is good today, however she was advised to be seen by   her PCP as soon as possible. Please call to schedule.  (Patient is still   driving home from flight, should be home by 5 pm.)   ---------------------------------------------------------------------------  --------------  Zack Banegas [facility]  [phone]; OK to leave message on voicemail  ---------------------------------------------------------------------------  --------------  SCRIPT ANSWERS

## 2023-10-12 ENCOUNTER — OFFICE VISIT (OUTPATIENT)
Dept: FAMILY MEDICINE CLINIC | Facility: CLINIC | Age: 58
End: 2023-10-12
Payer: MEDICAID

## 2023-10-12 VITALS
HEART RATE: 65 BPM | DIASTOLIC BLOOD PRESSURE: 98 MMHG | OXYGEN SATURATION: 98 % | HEIGHT: 62 IN | BODY MASS INDEX: 44.31 KG/M2 | WEIGHT: 240.8 LBS | SYSTOLIC BLOOD PRESSURE: 160 MMHG

## 2023-10-12 DIAGNOSIS — M25.552 PAIN OF LEFT HIP: ICD-10-CM

## 2023-10-12 DIAGNOSIS — G90.9 AUTONOMIC DYSFUNCTION: Primary | ICD-10-CM

## 2023-10-12 LAB
ALBUMIN SERPL-MCNC: 3.6 G/DL (ref 3.5–5)
ALBUMIN/GLOB SERPL: 1.1 (ref 0.4–1.6)
ALP SERPL-CCNC: 63 U/L (ref 50–136)
ALT SERPL-CCNC: 40 U/L (ref 12–65)
ANION GAP SERPL CALC-SCNC: 8 MMOL/L (ref 2–11)
AST SERPL-CCNC: 27 U/L (ref 15–37)
BASOPHILS # BLD: 0.1 K/UL (ref 0–0.2)
BASOPHILS NFR BLD: 1 % (ref 0–2)
BILIRUB SERPL-MCNC: 0.5 MG/DL (ref 0.2–1.1)
BUN SERPL-MCNC: 17 MG/DL (ref 6–23)
CALCIUM SERPL-MCNC: 9.6 MG/DL (ref 8.3–10.4)
CHLORIDE SERPL-SCNC: 103 MMOL/L (ref 101–110)
CO2 SERPL-SCNC: 33 MMOL/L (ref 21–32)
CREAT SERPL-MCNC: 0.9 MG/DL (ref 0.6–1)
DIFFERENTIAL METHOD BLD: ABNORMAL
EOSINOPHIL # BLD: 0.1 K/UL (ref 0–0.8)
EOSINOPHIL NFR BLD: 2 % (ref 0.5–7.8)
ERYTHROCYTE [DISTWIDTH] IN BLOOD BY AUTOMATED COUNT: 13.6 % (ref 11.9–14.6)
GLOBULIN SER CALC-MCNC: 3.3 G/DL (ref 2.8–4.5)
GLUCOSE SERPL-MCNC: 96 MG/DL (ref 65–100)
HCT VFR BLD AUTO: 40.2 % (ref 35.8–46.3)
HGB BLD-MCNC: 12.7 G/DL (ref 11.7–15.4)
IMM GRANULOCYTES # BLD AUTO: 0.1 K/UL (ref 0–0.5)
IMM GRANULOCYTES NFR BLD AUTO: 1 % (ref 0–5)
LYMPHOCYTES # BLD: 2 K/UL (ref 0.5–4.6)
LYMPHOCYTES NFR BLD: 31 % (ref 13–44)
MCH RBC QN AUTO: 26.5 PG (ref 26.1–32.9)
MCHC RBC AUTO-ENTMCNC: 31.6 G/DL (ref 31.4–35)
MCV RBC AUTO: 83.9 FL (ref 82–102)
MONOCYTES # BLD: 0.3 K/UL (ref 0.1–1.3)
MONOCYTES NFR BLD: 5 % (ref 4–12)
NEUTS SEG # BLD: 3.9 K/UL (ref 1.7–8.2)
NEUTS SEG NFR BLD: 60 % (ref 43–78)
NRBC # BLD: 0.02 K/UL (ref 0–0.2)
PLATELET # BLD AUTO: 223 K/UL (ref 150–450)
PLATELET COMMENT: ADEQUATE
PMV BLD AUTO: 13.8 FL (ref 9.4–12.3)
POTASSIUM SERPL-SCNC: 3.5 MMOL/L (ref 3.5–5.1)
PROT SERPL-MCNC: 6.9 G/DL (ref 6.3–8.2)
RBC # BLD AUTO: 4.79 M/UL (ref 4.05–5.2)
RBC MORPH BLD: ABNORMAL
SODIUM SERPL-SCNC: 144 MMOL/L (ref 133–143)
WBC # BLD AUTO: 6.5 K/UL (ref 4.3–11.1)
WBC MORPH BLD: ABNORMAL

## 2023-10-12 PROCEDURE — 99214 OFFICE O/P EST MOD 30 MIN: CPT | Performed by: FAMILY MEDICINE

## 2023-10-12 ASSESSMENT — ENCOUNTER SYMPTOMS
CHEST TIGHTNESS: 0
VOMITING: 0
SHORTNESS OF BREATH: 0
ABDOMINAL PAIN: 0
CONSTIPATION: 0
NAUSEA: 0
DIARRHEA: 0

## 2023-10-12 ASSESSMENT — PATIENT HEALTH QUESTIONNAIRE - PHQ9
2. FEELING DOWN, DEPRESSED OR HOPELESS: 0
SUM OF ALL RESPONSES TO PHQ QUESTIONS 1-9: 0
1. LITTLE INTEREST OR PLEASURE IN DOING THINGS: 0
SUM OF ALL RESPONSES TO PHQ9 QUESTIONS 1 & 2: 0
SUM OF ALL RESPONSES TO PHQ QUESTIONS 1-9: 0

## 2023-10-12 NOTE — PROGRESS NOTES
the first week of her trip the patient took azithromycin and cough syrup while in [place] due to getting sick cause of the dust aggravating her asthma. During her trip she was without CPAP for three weeks. Regarding ROS during the episode she denies fainting/LOC, seizures, sleep deprivation, HA, chest pain, SOB, and dizziness. Regarding vitals she remembers during the hypotensive episode she says her systolic was 64 and the flight medical support could not feel pulse, and her hypotension resolved when she was provided with oxygen and CPAP in flight. On Tuesday she drove herself 7.5hrs from [place] back to [place], and has had no more episodes of the prior symptoms and does not feel like she has gone hypotensive again. Hip: BL hip pain that has been present for a month that is worse on left compared to right. It is sharp constant 8/10 pain, no clear aggravating factors. Ibuprofen 400 mg that helps the pain go down to a 2/10. Stretching makes the pain worse. Denies radiation, prior trauma, numbness/tingling    Reports during her trip she was eating well with fresh fruit and vegetables, she's a well hydrated individual and drinks over 80 oz of water a day. Denies alcohol, tobacco, or other substance use. Review of Systems   Constitutional:  Negative for chills, fatigue and fever. Eyes:         Eye pressure increase felt cause of HTN today   Respiratory:  Negative for chest tightness and shortness of breath. Cardiovascular:  Negative for chest pain and palpitations. Gastrointestinal:  Negative for abdominal pain, constipation, diarrhea, nausea and vomiting. Genitourinary:  Negative for difficulty urinating. Musculoskeletal:  Positive for gait problem. Negative for joint swelling. Left hip pain with no radiation   Neurological:  Negative for dizziness, seizures, syncope, weakness, light-headedness, numbness and headaches. Psychiatric/Behavioral:  Negative for dysphoric mood.  The patient

## 2023-10-17 ENCOUNTER — HOSPITAL ENCOUNTER (OUTPATIENT)
Dept: PHYSICAL THERAPY | Age: 58
Setting detail: RECURRING SERIES
Discharge: HOME | End: 2023-10-20
Payer: MEDICAID

## 2023-10-17 DIAGNOSIS — M25.552 PAIN IN LEFT HIP: Primary | ICD-10-CM

## 2023-10-17 DIAGNOSIS — M62.81 MUSCLE WEAKNESS (GENERALIZED): ICD-10-CM

## 2023-10-17 PROCEDURE — 97161 PT EVAL LOW COMPLEX 20 MIN: CPT

## 2023-10-17 PROCEDURE — 97140 MANUAL THERAPY 1/> REGIONS: CPT

## 2023-10-17 PROCEDURE — 97110 THERAPEUTIC EXERCISES: CPT

## 2023-10-17 NOTE — PROGRESS NOTES
Mary Lozano  : 1965  Primary: [employer] (Medicaid Managed)  Secondary:  [address] @ [facility]  [address] [facility] [address]  Phone: [phone]  Fax: [phone] Plan Frequency: 2x/week for 90 days    Plan of Care/Certification Expiration Date: 01/15/24      >PT Visit Info:  Plan Frequency: 2x/week for 90 days  Plan of Care/Certification Expiration Date: 01/15/24      Visit Count:  1    OUTPATIENT PHYSICAL THERAPY: Treatment Note 10/17/2023       Episode  }Appt Desk             Treatment Diagnosis:    Pain in left hip  Muscle weakness (generalized)  Medical/Referring Diagnosis:      Referring Physician:  Lazarus Clara, MD MD Orders:  PT Eval and Treat   Date of Onset:  Onset Date: 09/15/23     Allergies:   Codeine  Restrictions/Precautions:  No data recordedNo data recorded     Interventions Planned (Treatment may consist of any combination of the following):    Current Treatment Recommendations: Strengthening; ROM; Balance training; Functional mobility training; ADL/Self-care training; Neuromuscular re-education; Manual; Pain management; Home exercise program; Safety education & training; Patient/Caregiver education & training; Modalities; Dry needling; Aquatics; Therapeutic activities     >Subjective Comments:  See Evaluation Note from today for details  >Initial:      /10>Post Session:        /10  Medications Last Reviewed:  10/17/2023  Updated Objective Findings:  See Evaluation Note from today  Treatment   THERAPEUTIC EXERCISE: (15 minutes):    Exercises per grid below to improve mobility, strength, and coordination. Required minimal visual, verbal, manual, and tactile cues to promote proper body posture and promote proper body mechanics. Progressed resistance, range, repetitions, and complexity of movement as indicated.      Date:  10/17     Activity/Exercise Parameters     Education Plan of car, prognosis, anatomy, HEP     NuStep X5min lvl 2

## 2023-10-17 NOTE — THERAPY EVALUATION
Services/Other Comments:  > Patient continues to require skilled intervention due to return to prior level of function. Total Duration:       Regarding Leslie Byrd's therapy, I certify that the treatment plan above will be carried out by a therapist or under their direction.   Thank you for this referral,  Nava Canada, PT     Referring Physician Signature: Neelam Santana MD                    Post Session Pain  Charge Capture  PT Visit Info MD Guidelines  MyChart

## 2023-10-24 ENCOUNTER — HOSPITAL ENCOUNTER (OUTPATIENT)
Dept: PHYSICAL THERAPY | Age: 58
Setting detail: RECURRING SERIES
End: 2023-10-24
Payer: MEDICAID

## 2023-10-24 NOTE — PROGRESS NOTES
Henry Colóning  : 1965  Primary: [employer]  Secondary:  [address] @ [address] [facility] [address]  Phone: [phone]  Fax: [phone] Plan Frequency: 2x/week for 90 days    Plan of Care/Certification Expiration Date: 01/15/24      PT Visit Info:  No data recorded         OUTPATIENT PHYSICAL THERAPY 10/24/2023     Appt Desk   Episode   MyChart      Ms. Melendrez Karlene had to cancel, insurance not verified yet      Zelphia Dk, PT    Future Appointments   Date Time Provider [address]   10/26/2023  3:15 PM Zelphia Dk, PT [facility]   10/31/2023 10:00 AM Nyasia Lugo DO [facility] AM[facility]   10/31/2023  1:45 PM Zelphia Dk, PT [facility]   2023  2:30 PM Zelphia Dk, PT [facility]   2023  1:45 PM Zelphia Dk, PT [facility]   2023 11:00 AM [facility] AMB   2023  1:45 PM Zelphia Dk, PT [facility]   2023 11:00 AM Elena Jung MD [facility] AMB   2023  1:45 PM Zelphia Dk, PT [facility]   2023  1:45 PM Zelphia Dk, PT [facility]   2023  1:45 PM Zelphia Dk, PT [facility]

## 2023-10-26 ENCOUNTER — APPOINTMENT (OUTPATIENT)
Dept: PHYSICAL THERAPY | Age: 58
End: 2023-10-26
Payer: MEDICAID

## 2023-10-31 ENCOUNTER — OFFICE VISIT (OUTPATIENT)
Age: 58
End: 2023-10-31
Payer: MEDICAID

## 2023-10-31 ENCOUNTER — HOSPITAL ENCOUNTER (OUTPATIENT)
Dept: PHYSICAL THERAPY | Age: 58
Setting detail: RECURRING SERIES
Discharge: HOME | End: 2023-11-03
Payer: MEDICAID

## 2023-10-31 VITALS
DIASTOLIC BLOOD PRESSURE: 84 MMHG | SYSTOLIC BLOOD PRESSURE: 126 MMHG | WEIGHT: 239 LBS | HEIGHT: 62 IN | HEART RATE: 80 BPM | BODY MASS INDEX: 43.98 KG/M2

## 2023-10-31 DIAGNOSIS — R00.2 PALPITATIONS: Primary | ICD-10-CM

## 2023-10-31 DIAGNOSIS — R09.02 HYPOXIA: ICD-10-CM

## 2023-10-31 DIAGNOSIS — I95.9 HYPOTENSION, UNSPECIFIED HYPOTENSION TYPE: ICD-10-CM

## 2023-10-31 PROCEDURE — 97110 THERAPEUTIC EXERCISES: CPT

## 2023-10-31 PROCEDURE — 97140 MANUAL THERAPY 1/> REGIONS: CPT

## 2023-10-31 PROCEDURE — 99214 OFFICE O/P EST MOD 30 MIN: CPT | Performed by: INTERNAL MEDICINE

## 2023-10-31 ASSESSMENT — ENCOUNTER SYMPTOMS
COUGH: 0
SHORTNESS OF BREATH: 1

## 2023-10-31 ASSESSMENT — PAIN SCALES - GENERAL: PAINLEVEL_OUTOF10: 2

## 2023-10-31 NOTE — PROGRESS NOTES
Heidi Horton  : 1965  Primary: [employer] (Medicaid Managed)  Secondary:  [address] @ [facility]  [address] [facility] [place] [address]  Phone: [phone]  Fax: [phone] Plan Frequency: 2x/week for 90 days    Plan of Care/Certification Expiration Date: 01/15/24      >PT Visit Info:  Plan Frequency: 2x/week for 90 days  Plan of Care/Certification Expiration Date: 01/15/24      Visit Count:  2    OUTPATIENT PHYSICAL THERAPY: Treatment Note 10/31/2023       Episode  }Appt Desk             Treatment Diagnosis:    No data found  Medical/Referring Diagnosis:      Referring Physician:  Geovanna Barry MD MD Orders:  PT Eval and Treat   Date of Onset:  Onset Date: 09/15/23     Allergies:   Codeine  Restrictions/Precautions:  No data recordedNo data recorded     Interventions Planned (Treatment may consist of any combination of the following):    Current Treatment Recommendations: Strengthening; ROM; Balance training; Functional mobility training; ADL/Self-care training; Neuromuscular re-education; Manual; Pain management; Home exercise program; Safety education & training; Patient/Caregiver education & training; Modalities; Dry needling; Aquatics; Therapeutic activities     >Subjective Comments:  Pt states she is feeling ok today  >Initial:     2/10>Post Session:       2/10  Medications Last Reviewed:  10/31/2023  Updated Objective Findings:  See Evaluation Note from today  Treatment   THERAPEUTIC EXERCISE: (33 minutes):    Exercises per grid below to improve mobility, strength, and coordination. Required minimal visual, verbal, manual, and tactile cues to promote proper body posture and promote proper body mechanics. Progressed resistance, range, repetitions, and complexity of movement as indicated.      Date:  10/17 Date:  10/31    Activity/Exercise Parameters     Education Plan of car, prognosis, anatomy, HEP     NuStep X5min lvl 2 X10min lvl 2    Lumbar

## 2023-11-02 ENCOUNTER — HOSPITAL ENCOUNTER (OUTPATIENT)
Dept: CT IMAGING | Age: 58
Discharge: HOME | End: 2023-11-02
Attending: INTERNAL MEDICINE
Payer: MEDICAID

## 2023-11-02 ENCOUNTER — HOSPITAL ENCOUNTER (OUTPATIENT)
Dept: PHYSICAL THERAPY | Age: 58
Setting detail: RECURRING SERIES
Discharge: HOME | End: 2023-11-05
Payer: MEDICAID

## 2023-11-02 DIAGNOSIS — I95.9 HYPOTENSION, UNSPECIFIED HYPOTENSION TYPE: ICD-10-CM

## 2023-11-02 DIAGNOSIS — R09.02 HYPOXIA: ICD-10-CM

## 2023-11-02 DIAGNOSIS — R00.2 PALPITATIONS: ICD-10-CM

## 2023-11-02 PROCEDURE — 71260 CT THORAX DX C+: CPT

## 2023-11-02 PROCEDURE — 97110 THERAPEUTIC EXERCISES: CPT

## 2023-11-02 PROCEDURE — 97140 MANUAL THERAPY 1/> REGIONS: CPT

## 2023-11-02 PROCEDURE — 6360000004 HC RX CONTRAST MEDICATION: Performed by: INTERNAL MEDICINE

## 2023-11-02 PROCEDURE — 2580000003 HC RX 258: Performed by: INTERNAL MEDICINE

## 2023-11-02 RX ORDER — 0.9 % SODIUM CHLORIDE 0.9 %
100 INTRAVENOUS SOLUTION INTRAVENOUS
Status: COMPLETED | OUTPATIENT
Start: 2023-11-02 | End: 2023-11-02

## 2023-11-02 RX ORDER — SODIUM CHLORIDE 0.9 % (FLUSH) 0.9 %
10 SYRINGE (ML) INJECTION
Status: COMPLETED | OUTPATIENT
Start: 2023-11-02 | End: 2023-11-02

## 2023-11-02 RX ADMIN — IOPAMIDOL 100 ML: 755 INJECTION, SOLUTION INTRAVENOUS at 16:25

## 2023-11-02 RX ADMIN — SODIUM CHLORIDE, PRESERVATIVE FREE 10 ML: 5 INJECTION INTRAVENOUS at 16:26

## 2023-11-02 RX ADMIN — SODIUM CHLORIDE 100 ML: 9 INJECTION, SOLUTION INTRAVENOUS at 16:26

## 2023-11-02 ASSESSMENT — PAIN SCALES - GENERAL: PAINLEVEL_OUTOF10: 2

## 2023-11-02 NOTE — PROGRESS NOTES
Lele Joseph  : 1965  Primary: [employer] (Medicaid Managed)  Secondary:  [address] @ [facility]  [address] [facility] [place] [address]  Phone: [phone]  Fax: [phone] Plan Frequency: 2x/week for 90 days    Plan of Care/Certification Expiration Date: 01/15/24      >PT Visit Info:  Plan Frequency: 2x/week for 90 days  Plan of Care/Certification Expiration Date: 01/15/24      Visit Count:  3    OUTPATIENT PHYSICAL THERAPY: Treatment Note 2023       Episode  }Appt Desk             Treatment Diagnosis:    No data found  Medical/Referring Diagnosis:      Referring Physician:  Eugenio Hamman, MD MD Orders:  PT Eval and Treat   Date of Onset:  Onset Date: 09/15/23     Allergies:   Codeine  Restrictions/Precautions:  No data recordedNo data recorded     Interventions Planned (Treatment may consist of any combination of the following):    Current Treatment Recommendations: Strengthening; ROM; Balance training; Functional mobility training; ADL/Self-care training; Neuromuscular re-education; Manual; Pain management; Home exercise program; Safety education & training; Patient/Caregiver education & training; Modalities; Dry needling; Aquatics; Therapeutic activities     >Subjective Comments:  PT states she is feeling better, not as sore  >Initial:     2/10>Post Session:       2/10  Medications Last Reviewed:  2023  Updated Objective Findings:  See Evaluation Note from today  Treatment   THERAPEUTIC EXERCISE: (35 minutes):    Exercises per grid below to improve mobility, strength, and coordination. Required minimal visual, verbal, manual, and tactile cues to promote proper body posture and promote proper body mechanics. Progressed resistance, range, repetitions, and complexity of movement as indicated.      Date:  10/17 Date:  10/31 Date:     Activity/Exercise Parameters     Education Plan of car, prognosis, anatomy, HEP     NuStep X5min lvl 2 X10min lvl

## 2023-11-03 ENCOUNTER — TELEPHONE (OUTPATIENT)
Age: 58
End: 2023-11-03

## 2023-11-03 NOTE — TELEPHONE ENCOUNTER
----- Message from Karen Meeks DO sent at 11/3/2023  1:54 PM EDT -----  Ky Yuen,     Please call the patient regarding their result. No evidence of blood clots in the lungs on CT scan. CT scan did identify nodules on the adrenal glands . Recommendation from radiology to have further imaging with MRI or CT. Also noted skin changes near left breast.     Recommend she call her PCP (Dr Shahzad Tucker) to arrange for follow up abdominal imaging and examination. I have copied Dr Shahzad Tucker on this note as well. Thank you.

## 2023-11-03 NOTE — RESULT ENCOUNTER NOTE
Cary Little,     Please call the patient regarding their result. No evidence of blood clots in the lungs on CT scan. CT scan did identify nodules on the adrenal glands . Recommendation from radiology to have further imaging with MRI or CT. Also noted skin changes near left breast.     Recommend she call her PCP (Dr Santi Evans) to arrange for follow up abdominal imaging and examination. I have copied Dr Santi Evans on this note as well. Thank you.

## 2023-11-06 DIAGNOSIS — I10 PRIMARY HYPERTENSION: Primary | ICD-10-CM

## 2023-11-07 ENCOUNTER — HOSPITAL ENCOUNTER (OUTPATIENT)
Dept: PHYSICAL THERAPY | Age: 58
Setting detail: RECURRING SERIES
Discharge: HOME | End: 2023-11-10
Payer: MEDICAID

## 2023-11-07 PROCEDURE — 97140 MANUAL THERAPY 1/> REGIONS: CPT

## 2023-11-07 PROCEDURE — 97110 THERAPEUTIC EXERCISES: CPT

## 2023-11-07 ASSESSMENT — PAIN SCALES - GENERAL: PAINLEVEL_OUTOF10: 1

## 2023-11-07 NOTE — PROGRESS NOTES
Lele Joseph  : 1965  Primary: [employer] (Medicaid Managed)  Secondary:  [address] @ [facility]  [address] [facility] [address]  Phone: [phone]  Fax: [phone] Plan Frequency: 2x/week for 90 days    Plan of Care/Certification Expiration Date: 01/15/24      >PT Visit Info:  Plan Frequency: 2x/week for 90 days  Plan of Care/Certification Expiration Date: 01/15/24      Visit Count:  4    OUTPATIENT PHYSICAL THERAPY: Treatment Note 2023       Episode  }Appt Desk             Treatment Diagnosis:    No data found  Medical/Referring Diagnosis:      Referring Physician:  Eugenio Hamman, MD MD Orders:  PT Eval and Treat   Date of Onset:  Onset Date: 09/15/23     Allergies:   Codeine  Restrictions/Precautions:  No data recordedNo data recorded     Interventions Planned (Treatment may consist of any combination of the following):    Current Treatment Recommendations: Strengthening; ROM; Balance training; Functional mobility training; ADL/Self-care training; Neuromuscular re-education; Manual; Pain management; Home exercise program; Safety education & training; Patient/Caregiver education & training; Modalities; Dry needling; Aquatics; Therapeutic activities     >Subjective Comments:  Pt states she is feeling good, can see improvements  >Initial:     1/10>Post Session:       1/10  Medications Last Reviewed:  2023  Updated Objective Findings:  See Evaluation Note from today  Treatment   THERAPEUTIC EXERCISE: (30 minutes):    Exercises per grid below to improve mobility, strength, and coordination. Required minimal visual, verbal, manual, and tactile cues to promote proper body posture and promote proper body mechanics. Progressed resistance, range, repetitions, and complexity of movement as indicated.      Date:  10/31 Date:   Date:     Activity/Exercise      Education      NuStep X10min lvl 2 X10min lvl 3 X10min lvl 4   Lumbar Rotation X20 B

## 2023-11-09 ENCOUNTER — NURSE ONLY (OUTPATIENT)
Dept: FAMILY MEDICINE CLINIC | Facility: CLINIC | Age: 58
End: 2023-11-09

## 2023-11-09 ENCOUNTER — HOSPITAL ENCOUNTER (OUTPATIENT)
Dept: PHYSICAL THERAPY | Age: 58
Setting detail: RECURRING SERIES
Discharge: HOME | End: 2023-11-12
Payer: MEDICAID

## 2023-11-09 DIAGNOSIS — I10 PRIMARY HYPERTENSION: ICD-10-CM

## 2023-11-09 LAB
BASOPHILS # BLD: 0 K/UL (ref 0–0.2)
BASOPHILS NFR BLD: 1 % (ref 0–2)
DIFFERENTIAL METHOD BLD: ABNORMAL
EOSINOPHIL # BLD: 0.1 K/UL (ref 0–0.8)
EOSINOPHIL NFR BLD: 2 % (ref 0.5–7.8)
ERYTHROCYTE [DISTWIDTH] IN BLOOD BY AUTOMATED COUNT: 13.1 % (ref 11.9–14.6)
HCT VFR BLD AUTO: 39.8 % (ref 35.8–46.3)
HGB BLD-MCNC: 12.5 G/DL (ref 11.7–15.4)
IMM GRANULOCYTES # BLD AUTO: 0 K/UL (ref 0–0.5)
IMM GRANULOCYTES NFR BLD AUTO: 1 % (ref 0–5)
LYMPHOCYTES # BLD: 1.4 K/UL (ref 0.5–4.6)
LYMPHOCYTES NFR BLD: 29 % (ref 13–44)
MCH RBC QN AUTO: 26.4 PG (ref 26.1–32.9)
MCHC RBC AUTO-ENTMCNC: 31.4 G/DL (ref 31.4–35)
MCV RBC AUTO: 84.1 FL (ref 82–102)
MONOCYTES # BLD: 0.2 K/UL (ref 0.1–1.3)
MONOCYTES NFR BLD: 5 % (ref 4–12)
NEUTS SEG # BLD: 2.9 K/UL (ref 1.7–8.2)
NEUTS SEG NFR BLD: 62 % (ref 43–78)
NRBC # BLD: 0 K/UL (ref 0–0.2)
PLATELET # BLD AUTO: 170 K/UL (ref 150–450)
PMV BLD AUTO: 13.6 FL (ref 9.4–12.3)
RBC # BLD AUTO: 4.73 M/UL (ref 4.05–5.2)
WBC # BLD AUTO: 4.6 K/UL (ref 4.3–11.1)

## 2023-11-09 PROCEDURE — 97110 THERAPEUTIC EXERCISES: CPT

## 2023-11-09 ASSESSMENT — PAIN SCALES - GENERAL: PAINLEVEL_OUTOF10: 1

## 2023-11-09 NOTE — PROGRESS NOTES
Shannon Dumont  : 1965  Primary: [employer] (Medicaid Managed)  Secondary:  [address] @ [facility]  [address] [facility] [address]  Phone: [phone]  Fax: [phone] Plan Frequency: 2x/week for 90 days    Plan of Care/Certification Expiration Date: 01/15/24      >PT Visit Info:  Plan Frequency: 2x/week for 90 days  Plan of Care/Certification Expiration Date: 01/15/24      Visit Count:  5    OUTPATIENT PHYSICAL THERAPY: Treatment Note 2023       Episode  }Appt Desk             Treatment Diagnosis:    No data found  Medical/Referring Diagnosis:      Referring Physician:  Rocky Manning MD MD Orders:  PT Eval and Treat   Date of Onset:  Onset Date: 09/15/23     Allergies:   Codeine  Restrictions/Precautions:  No data recordedNo data recorded     Interventions Planned (Treatment may consist of any combination of the following):    Current Treatment Recommendations: Strengthening; ROM; Balance training; Functional mobility training; ADL/Self-care training; Neuromuscular re-education; Manual; Pain management; Home exercise program; Safety education & training; Patient/Caregiver education & training; Modalities; Dry needling; Aquatics; Therapeutic activities     >Subjective Comments:  Pt states she is getting better  >Initial:     1/10>Post Session:       1/10  Medications Last Reviewed:  2023  Updated Objective Findings:  See Evaluation Note from today  Treatment   THERAPEUTIC EXERCISE: (45 minutes):    Exercises per grid below to improve mobility, strength, and coordination. Required minimal visual, verbal, manual, and tactile cues to promote proper body posture and promote proper body mechanics. Progressed resistance, range, repetitions, and complexity of movement as indicated.      Date:   Date:   Date:     Activity/Exercise      Education      NuStep X10min lvl 3 X10min lvl 4 x10min lvl 4   Lumbar Rotation  x15 x15   SKTC   x3min B

## 2023-11-10 LAB
ALBUMIN SERPL-MCNC: 3.4 G/DL (ref 3.5–5)
ALBUMIN/GLOB SERPL: 1.1 (ref 0.4–1.6)
ALP SERPL-CCNC: 65 U/L (ref 50–136)
ALT SERPL-CCNC: 21 U/L (ref 12–65)
ANION GAP SERPL CALC-SCNC: 7 MMOL/L (ref 2–11)
AST SERPL-CCNC: 10 U/L (ref 15–37)
BILIRUB SERPL-MCNC: 0.4 MG/DL (ref 0.2–1.1)
BUN SERPL-MCNC: 16 MG/DL (ref 6–23)
CALCIUM SERPL-MCNC: 9.2 MG/DL (ref 8.3–10.4)
CHLORIDE SERPL-SCNC: 104 MMOL/L (ref 101–110)
CO2 SERPL-SCNC: 31 MMOL/L (ref 21–32)
CREAT SERPL-MCNC: 1 MG/DL (ref 0.6–1)
GLOBULIN SER CALC-MCNC: 3.1 G/DL (ref 2.8–4.5)
GLUCOSE SERPL-MCNC: 105 MG/DL (ref 65–100)
POTASSIUM SERPL-SCNC: 3.5 MMOL/L (ref 3.5–5.1)
PROT SERPL-MCNC: 6.5 G/DL (ref 6.3–8.2)
SODIUM SERPL-SCNC: 142 MMOL/L (ref 133–143)

## 2023-11-13 ENCOUNTER — OFFICE VISIT (OUTPATIENT)
Dept: FAMILY MEDICINE CLINIC | Facility: CLINIC | Age: 58
End: 2023-11-13
Payer: MEDICAID

## 2023-11-13 VITALS
BODY MASS INDEX: 44.16 KG/M2 | DIASTOLIC BLOOD PRESSURE: 84 MMHG | SYSTOLIC BLOOD PRESSURE: 120 MMHG | WEIGHT: 240 LBS | OXYGEN SATURATION: 98 % | HEIGHT: 62 IN | HEART RATE: 62 BPM

## 2023-11-13 DIAGNOSIS — E27.8 ADRENAL NODULE (HCC): Primary | ICD-10-CM

## 2023-11-13 DIAGNOSIS — I10 PRIMARY HYPERTENSION: ICD-10-CM

## 2023-11-13 DIAGNOSIS — R55 NEAR SYNCOPE: ICD-10-CM

## 2023-11-13 PROCEDURE — 3074F SYST BP LT 130 MM HG: CPT | Performed by: FAMILY MEDICINE

## 2023-11-13 PROCEDURE — 3079F DIAST BP 80-89 MM HG: CPT | Performed by: FAMILY MEDICINE

## 2023-11-13 PROCEDURE — 99213 OFFICE O/P EST LOW 20 MIN: CPT | Performed by: FAMILY MEDICINE

## 2023-11-13 ASSESSMENT — PATIENT HEALTH QUESTIONNAIRE - PHQ9
SUM OF ALL RESPONSES TO PHQ QUESTIONS 1-9: 0
SUM OF ALL RESPONSES TO PHQ QUESTIONS 1-9: 0
SUM OF ALL RESPONSES TO PHQ9 QUESTIONS 1 & 2: 0
SUM OF ALL RESPONSES TO PHQ QUESTIONS 1-9: 0
2. FEELING DOWN, DEPRESSED OR HOPELESS: 0
1. LITTLE INTEREST OR PLEASURE IN DOING THINGS: 0
SUM OF ALL RESPONSES TO PHQ QUESTIONS 1-9: 0

## 2023-11-13 ASSESSMENT — ENCOUNTER SYMPTOMS: RESPIRATORY NEGATIVE: 1

## 2023-11-13 NOTE — PROGRESS NOTES
PROGRESS NOTE    SUBJECTIVE:   Corbin Christine is a 66 y.o. female seen for a follow up visit regarding   Chief Complaint   Patient presents with    Hypertension     Follow up   Had lab work done 11/9/23; would like to discuss results    Other     Has followed up with Cardiology; wearing monitor that she will send back tomorrow. HPI:  Here for follow-up after seeing cardiology for near syncopal episodes. She has been wearing a heart monitor, she will be sending it back within a couple days. She has had 1 episode since she has had the monitoring device on. Cardiology ordered a CT PE study which was negative for pulmonary embolus but an incidentaloma was found on the left adrenal gland, small, radiology has recommended a CT abdomen adrenal protocol. Reviewed and updated this visit by provider:  Tobacco  Allergies  Meds  Problems  Med Hx  Surg Hx  Fam Hx           Review of Systems   Respiratory: Negative. Cardiovascular: Negative. OBJECTIVE:  Vitals:    11/13/23 0807   BP: 120/84   Site: Left Upper Arm   Cuff Size: Large Adult   Pulse: 62   SpO2: 98%   Weight: 108.9 kg (240 lb)   Height: 1.575 m (5' 2.01\")        Physical Exam   General: Alert and oriented x3, well-appearing  Neck: No adenopathy, thyromegaly or thyroid nodules  Pulmonary: Normal effort, good airflow, no rales or rhonchi  CVS: Regular rate and rhythm, normal S1, S2, no S3 or S4, no murmurs; no carotid bruits, 2+ pedal pulses      Medical problems and test results were reviewed with the patient today.      Recent Results (from the past 672 hour(s))   Extended cardiac holter monitor (48 hrs - 15 days)    Collection Time: 10/31/23 10:56 AM   Result Value Ref Range    Body Surface Area 2.18 m2   Comprehensive Metabolic Panel    Collection Time: 11/09/23 10:59 AM   Result Value Ref Range    Sodium 142 133 - 143 mmol/L    Potassium 3.5 3.5 - 5.1 mmol/L    Chloride 104 101 - 110 mmol/L    CO2 31 21 - 32 mmol/L

## 2023-11-14 ENCOUNTER — APPOINTMENT (OUTPATIENT)
Dept: PHYSICAL THERAPY | Age: 58
End: 2023-11-14
Payer: MEDICAID

## 2023-11-16 ENCOUNTER — APPOINTMENT (OUTPATIENT)
Dept: PHYSICAL THERAPY | Age: 58
End: 2023-11-16
Payer: MEDICAID

## 2023-11-21 ENCOUNTER — APPOINTMENT (OUTPATIENT)
Dept: PHYSICAL THERAPY | Age: 58
End: 2023-11-21
Payer: MEDICAID

## 2023-11-22 ENCOUNTER — TELEPHONE (OUTPATIENT)
Dept: FAMILY MEDICINE CLINIC | Facility: CLINIC | Age: 58
End: 2023-11-22

## 2023-11-22 DIAGNOSIS — M54.30 BACK PAIN WITH SCIATICA: Primary | ICD-10-CM

## 2023-11-22 DIAGNOSIS — M54.9 BACK PAIN WITH SCIATICA: Primary | ICD-10-CM

## 2023-11-22 NOTE — TELEPHONE ENCOUNTER
Patient contacted. Reports insurance missed up with PT and had to postpone a week but has been doing PT. Reports she doesn't feel as though PT is helping. Pain is shooting to right leg and upper right; pain is getting worse. Patient wanting to know if she could get XR done on her back? Reports having CT scheduled for the 30th and would like to have XR done at the same time. Please advise.

## 2023-11-22 NOTE — TELEPHONE ENCOUNTER
Attempted to contact patient x 3; kept receiving message stating \"sorry your call can not be completed at this time, try your call again later. \"

## 2023-11-27 NOTE — TELEPHONE ENCOUNTER
Attempted to contact patient x 2; reached message stating call could not go through right now. Try again later.

## 2023-11-27 NOTE — TELEPHONE ENCOUNTER
Patient contacted with message from provider; voiced understanding. Willing to follow up with Ortho; referral placed.

## 2023-11-28 ENCOUNTER — HOSPITAL ENCOUNTER (OUTPATIENT)
Dept: PHYSICAL THERAPY | Age: 58
Setting detail: RECURRING SERIES
End: 2023-11-28
Payer: MEDICAID

## 2023-11-28 NOTE — PROGRESS NOTES
Governor Montserrat  : 1965  Primary: [employer]  Secondary:  [address] @ [address] [place] [address]  Phone: [phone]  Fax: [phone] Plan Frequency: 2x/week for 90 days    Plan of Care/Certification Expiration Date: 01/15/24      PT Visit Info:  No data recorded       OUTPATIENT PHYSICAL THERAPY 2023     Appt Desk   Episode   MyChart      Ms. Noé Marie cancelled appointment due to illness.       Mark London PTA    Future Appointments   Date Time Provider [address]   2023 52:31 AM SFO CT 64 SLICE UNIT 1 [facility]   2024 10:30 AM Di Blevins MD [facility] AMB

## 2023-12-01 ENCOUNTER — TELEPHONE (OUTPATIENT)
Dept: FAMILY MEDICINE CLINIC | Facility: CLINIC | Age: 58
End: 2023-12-01

## 2023-12-01 NOTE — TELEPHONE ENCOUNTER
Patient would like a return call in regards to her recent trip to the er and if she still needs to for xrays.

## 2023-12-01 NOTE — TELEPHONE ENCOUNTER
Patient contacted. Reports going to ER day before yesterday due to increase pain. Had XR's done which showed some degenerative changes. Reports ER is ordering US to rule out DVT; unable to obtain during ER visit. Reports PT keeps rescheduling her visit. Does not believe PT is helping any ways and does not plan on going back. CT Abdomen was rescheduled due to insurance had not approved imaging; rescheduled for next week if insurances approves. Wanted to update provider on what has been going on. Just FYI

## 2023-12-05 ENCOUNTER — OFFICE VISIT (OUTPATIENT)
Dept: ORTHOPEDIC SURGERY | Age: 58
End: 2023-12-05
Payer: MEDICAID

## 2023-12-05 DIAGNOSIS — M51.36 DDD (DEGENERATIVE DISC DISEASE), LUMBAR: ICD-10-CM

## 2023-12-05 DIAGNOSIS — M47.816 FACET ARTHROPATHY, LUMBAR: Primary | ICD-10-CM

## 2023-12-05 DIAGNOSIS — M54.16 LUMBAR RADICULOPATHY: ICD-10-CM

## 2023-12-05 PROCEDURE — 99203 OFFICE O/P NEW LOW 30 MIN: CPT | Performed by: PHYSICIAN ASSISTANT

## 2023-12-05 RX ORDER — KETOROLAC TROMETHAMINE 10 MG/1
10 TABLET, FILM COATED ORAL EVERY 6 HOURS PRN
COMMUNITY
Start: 2023-11-30

## 2023-12-05 RX ORDER — ONDANSETRON 4 MG/1
4 TABLET, ORALLY DISINTEGRATING ORAL 3 TIMES DAILY PRN
COMMUNITY
Start: 2023-11-30 | End: 2023-12-14

## 2023-12-05 RX ORDER — HYDROCODONE BITARTRATE AND ACETAMINOPHEN 5; 325 MG/1; MG/1
TABLET ORAL
COMMUNITY
Start: 2023-11-30

## 2023-12-05 NOTE — PROGRESS NOTES
Name: Chito Powers  YOB: 1965  Gender: female  MRN: [id number]    CC: Back Problem (Left ant leg)       HPI: This is a 66y.o. year old female who having pain in the left side of her lower back around July 2023 without a particular injury. It stayed in the lower back left buttock left hip. She will take Tylenol and Advil and then began radiating into the left anterior thigh and this would stop her from being able to stand and walk with anterior thigh pain. Does not radiate below her knee. She saw her PCP she was referred to physical therapy she began physical therapy August 17, 2023. She has been working with physical therapy and has not seen improvement of the symptoms pain is mostly in the left anterior thigh now. She is using a gun, she did go to the emergency department and was prescribed ketorolac and oxycodone. Has no numbness or tingling    History was obtained by patient    The patient denies any change in bowel or bladder function since the onset of the symptoms. she  has not had lumbar surgery in the past.      She has attended physical therapy and continues a home exercise program. These exercises included: Pelvic tilt, cat and camel, single knee-to-chest, double knee-to-chest, lower trunk rotation, pelvic bridging, prone on elbows, prone on extended arms, standing back extensions, side bend, prone upper extremity exercise, prone lower extremity exercise. 12/5/2023     2:37 PM   AMB PAIN ASSESSMENT   Location of Pain Back    Location Modifiers Left   Severity of Pain 7   Quality of Pain Aching;Dull   Duration of Pain Persistent   Frequency of Pain Intermittent   Date Pain First Started 9/11/2023   Limiting Behavior Some   Relieving Factors Other (Comment)    Result of Injury No   Work-Related Injury No   Are there other pain locations you wish to document?  No       Significant value            ROS/Meds/PSH/PMH/FH/SH: I personally reviewed the patient's collected

## 2023-12-06 ENCOUNTER — TELEPHONE (OUTPATIENT)
Dept: FAMILY MEDICINE CLINIC | Facility: CLINIC | Age: 58
End: 2023-12-06

## 2023-12-06 DIAGNOSIS — E27.8 ADRENAL NODULE (HCC): Primary | ICD-10-CM

## 2023-12-06 DIAGNOSIS — E27.9 LESION OF ADRENAL GLAND (HCC): ICD-10-CM

## 2023-12-06 NOTE — TELEPHONE ENCOUNTER
Per fax from insurance; denied coverage on CT Abdomen/Pelvis due to not enough clinical information given to support medically necessary. [id number]  [address]: [phone]    Please advise.

## 2023-12-07 ENCOUNTER — TELEPHONE (OUTPATIENT)
Dept: ORTHOPEDIC SURGERY | Age: 58
End: 2023-12-07

## 2023-12-08 NOTE — TELEPHONE ENCOUNTER
Per Dr. Thompson Heal; resubmit CT with diagnosis of \"right 2.8 cm adrenal nodule and left 1 cm adrenal nodule noted on chest CT, radiologist has recommended formal adrenal ct. \"       Referral placed with diagnosis of adrenal nodule and adrenal lesion.

## 2023-12-14 ENCOUNTER — TELEPHONE (OUTPATIENT)
Dept: ORTHOPEDIC SURGERY | Age: 58
End: 2023-12-14

## 2023-12-14 NOTE — TELEPHONE ENCOUNTER
Called and informed patient that MRI and MRI follow up have been canceled due to being in a pending status. Patient aware once approved she will be rescheduled.

## 2024-01-29 ENCOUNTER — TELEPHONE (OUTPATIENT)
Dept: FAMILY MEDICINE CLINIC | Facility: CLINIC | Age: 59
End: 2024-01-29

## 2024-01-29 DIAGNOSIS — E27.8 ADRENAL NODULE (HCC): Primary | ICD-10-CM

## 2024-01-29 NOTE — TELEPHONE ENCOUNTER
Patient called stating she went to ER yesterday due to severe abdominal pain. Was dx with UTI and diverticulitis. Patient stated she was advised to contact PCP regarding needing additional imaging done due to abnormal findings on CT. Please advise if imaging can be ordered or if patient will need follow up appointment.

## 2024-01-30 NOTE — TELEPHONE ENCOUNTER
Patient notified of message from provider; voiced understanding and thanks. Willing to follow up with Endo. Referral placed.

## 2024-02-14 ENCOUNTER — OFFICE VISIT (OUTPATIENT)
Dept: ENDOCRINOLOGY | Age: 59
End: 2024-02-14
Payer: MEDICAID

## 2024-02-14 VITALS
HEART RATE: 91 BPM | BODY MASS INDEX: 44.8 KG/M2 | OXYGEN SATURATION: 99 % | DIASTOLIC BLOOD PRESSURE: 78 MMHG | SYSTOLIC BLOOD PRESSURE: 122 MMHG | WEIGHT: 245 LBS

## 2024-02-14 DIAGNOSIS — R63.5 WEIGHT GAIN: ICD-10-CM

## 2024-02-14 DIAGNOSIS — E27.8 ADRENAL NODULE (HCC): Primary | ICD-10-CM

## 2024-02-14 DIAGNOSIS — I10 ESSENTIAL HYPERTENSION: ICD-10-CM

## 2024-02-14 PROBLEM — E27.9 ADRENAL NODULE (HCC): Status: ACTIVE | Noted: 2024-02-14

## 2024-02-14 PROCEDURE — 3078F DIAST BP <80 MM HG: CPT | Performed by: INTERNAL MEDICINE

## 2024-02-14 PROCEDURE — 99203 OFFICE O/P NEW LOW 30 MIN: CPT | Performed by: INTERNAL MEDICINE

## 2024-02-14 PROCEDURE — 3074F SYST BP LT 130 MM HG: CPT | Performed by: INTERNAL MEDICINE

## 2024-02-14 NOTE — PROGRESS NOTES
CAROLYNE Booker MD, [facility] ENDOCRINOLOGY   AND   THYROID NODULE CLINIC            Reason for visit: Leslie Byrd is referred by Carlos Gomez MD for the evaluation and management of an adrenal nodule.        ASSESSMENT AND PLAN:    1. Adrenal nodule (HCC)  I will arrange an adrenal-protocol CT (precontrast images, immediate postcontrast images, and delayed postcontrast images) and arrange testing for hormonal hypersecretion as follows:  -Aldosterone/renin to assess for primary hyperaldosteronism  -Fractionated plasma metanephrines to assess for pheochromocytoma  -DHEA-sulfate to assess for subclinical Cushing syndrome (if greater than 100, adrenal Cushing's syndrome is excluded; if less than 100, a low-dose overnight dexamethasone suppression test will be necessary)    - CT ABDOMEN W WO CONTRAST; Future  - ACTH; Future  - DHEA-Sulfate; Future  - Aldosterone; Future  - Renin; Future  - Metanephrines Plasma Free; Future  - Comprehensive Metabolic Panel; Future    2. Essential hypertension    3. Weight gain        Follow-up and Dispositions    Return in about 1 year (around 2025).         History of Present Illness:    ADRENAL PROBLEM  Leslie Byrd is referred for evaluation of an adrenal nodule, incidentally noted on CT chest in 2023.    Current symptoms: See review of systems below    Pertinent comorbidities:   -hypertension diagnosed in her 40s, treated with HCTZ/triamterene and nifedipine, usually well-controlled  -occasional mild hypokalemia    Prior treatment: None    Pertinent labs: None    Imagin2023: CT chest pulmonary embolism protocol ([facility])- 2.8 cm heterogeneous right adrenal nodule.  1.0 cm indeterminate left adrenal nodule.    2024: CT abdomen and pelvis with contrast ([facility])- 2.4 cm indeterminate right adrenal mass.  Nodular thickening of the left adrenal.    Review of Systems   Constitutional:  Positive for fatigue and unexpected

## 2024-02-15 DIAGNOSIS — E27.8 ADRENAL NODULE (HCC): ICD-10-CM

## 2024-02-15 LAB
ALBUMIN SERPL-MCNC: 3.6 G/DL (ref 3.5–5)
ALBUMIN/GLOB SERPL: 1.1 (ref 0.4–1.6)
ALP SERPL-CCNC: 69 U/L (ref 50–136)
ALT SERPL-CCNC: 24 U/L (ref 12–65)
ANION GAP SERPL CALC-SCNC: 3 MMOL/L (ref 2–11)
AST SERPL-CCNC: 11 U/L (ref 15–37)
BILIRUB SERPL-MCNC: 0.7 MG/DL (ref 0.2–1.1)
BUN SERPL-MCNC: 14 MG/DL (ref 6–23)
CALCIUM SERPL-MCNC: 9.1 MG/DL (ref 8.3–10.4)
CHLORIDE SERPL-SCNC: 107 MMOL/L (ref 103–113)
CO2 SERPL-SCNC: 31 MMOL/L (ref 21–32)
CREAT SERPL-MCNC: 0.9 MG/DL (ref 0.6–1)
GLOBULIN SER CALC-MCNC: 3.4 G/DL (ref 2.8–4.5)
GLUCOSE SERPL-MCNC: 128 MG/DL (ref 65–100)
POTASSIUM SERPL-SCNC: 3.3 MMOL/L (ref 3.5–5.1)
PROT SERPL-MCNC: 7 G/DL (ref 6.3–8.2)
SODIUM SERPL-SCNC: 141 MMOL/L (ref 136–146)

## 2024-02-16 LAB — ACTH PLAS-MCNC: 31 PG/ML (ref 7.2–63.3)

## 2024-02-17 LAB — DHEA-S SERPL-MCNC: 160 UG/DL (ref 29.4–220.5)

## 2024-02-19 LAB — ALDOST SERPL-MCNC: 28.3 NG/DL (ref 0–30)

## 2024-02-22 LAB — RENIN PLAS-CCNC: 0.36 NG/ML/HR (ref 0.17–5.38)

## 2024-02-23 NOTE — PROGRESS NOTES
My Chart message to patient:    Thank you for doing all of the lab testing.  It took a few days to get everything back, but I now have the results needed.  The only abnormality is that the aldosterone level is somewhat elevated.  Aldosterone is a hormone made by the adrenal glands which regulates blood pressure and potassium balance.  As you know, you have high blood pressure and have at times had low potassium as well.  It is certainly possible that the spot on your adrenal gland is overproducing aldosterone.  In that setting, there are two treatment options:    -First, I can send you to a surgeon and have that part of the adrenal gland removed.  That surgery is typically done laparoscopically.  That MIGHT make your blood pressure and potassium issues better.  -Second, I can prescribe you a medication which would counteract the aldosterone and help with both blood pressure and potassium balance.  That medication is called spironolactone.    If you favor a surgical treatment, there is some additional testing that we would likely need to do to prove that the aldosterone is coming from that spot rather than equally from both adrenal glands (I would not want to send you to surgery unless I knew for certain that the spot on the adrenal gland was the culprit).  If you are not interested in a surgical treatment, which I think is fine, I can get you started on the spironolactone (probably in place of the current blood pressure medication) and monitor things over time.  If you prefer a surgical treatment, let me know and we can discuss the additional steps necessary.    If you have any questions, or if you would prefer that I call you to discuss, please let me know.  I look forward to hearing back from you.    Thanks.  Have a great weekend.    Dr. Booker, endocrinology

## 2024-02-25 LAB
METANEPH FREE SERPL-MCNC: 68 PG/ML (ref 0–88)
NORMETANEPHRINE SERPL-MCNC: 86 PG/ML (ref 0–244)

## 2024-03-12 ENCOUNTER — OFFICE VISIT (OUTPATIENT)
Age: 59
End: 2024-03-12
Payer: MEDICAID

## 2024-03-12 VITALS
BODY MASS INDEX: 45.08 KG/M2 | WEIGHT: 245 LBS | HEIGHT: 62 IN | DIASTOLIC BLOOD PRESSURE: 80 MMHG | HEART RATE: 68 BPM | SYSTOLIC BLOOD PRESSURE: 122 MMHG

## 2024-03-12 DIAGNOSIS — I10 PRIMARY HYPERTENSION: ICD-10-CM

## 2024-03-12 PROCEDURE — 99214 OFFICE O/P EST MOD 30 MIN: CPT | Performed by: INTERNAL MEDICINE

## 2024-03-12 PROCEDURE — 3074F SYST BP LT 130 MM HG: CPT | Performed by: INTERNAL MEDICINE

## 2024-03-12 PROCEDURE — 3079F DIAST BP 80-89 MM HG: CPT | Performed by: INTERNAL MEDICINE

## 2024-03-12 RX ORDER — TRIAMTERENE AND HYDROCHLOROTHIAZIDE 75; 50 MG/1; MG/1
TABLET ORAL
Qty: 90 TABLET | Refills: 3 | Status: SHIPPED | OUTPATIENT
Start: 2024-03-12

## 2024-03-12 ASSESSMENT — ENCOUNTER SYMPTOMS
SHORTNESS OF BREATH: 0
ABDOMINAL PAIN: 0

## 2024-03-12 NOTE — PROGRESS NOTES
Blood pressure at goal today.  Cardiac exam is benign.  Patient will continue with her current medications.  Pap see the patient back as needed      No follow-ups on file.          Thank you for allowing me to participate in this patient's care.  Please call or contact me if there are any questions or concerns regarding the above.      Socrates Zapata III, MD  03/12/24  2:39 PM

## 2024-03-15 ENCOUNTER — HOSPITAL ENCOUNTER (OUTPATIENT)
Dept: CT IMAGING | Age: 59
Discharge: HOME | End: 2024-03-18
Attending: INTERNAL MEDICINE

## 2024-03-15 DIAGNOSIS — E27.8 ADRENAL NODULE (HCC): ICD-10-CM

## 2024-03-25 DIAGNOSIS — E27.8 ADRENAL NODULE (HCC): Primary | ICD-10-CM

## 2024-04-10 ENCOUNTER — HOSPITAL ENCOUNTER (OUTPATIENT)
Dept: MRI IMAGING | Age: 59
Discharge: HOME | End: 2024-04-13
Attending: INTERNAL MEDICINE
Payer: MEDICAID

## 2024-04-10 DIAGNOSIS — E27.8 ADRENAL NODULE (HCC): ICD-10-CM

## 2024-04-10 PROCEDURE — 2580000003 HC RX 258: Performed by: INTERNAL MEDICINE

## 2024-04-10 PROCEDURE — 74183 MRI ABD W/O CNTR FLWD CNTR: CPT

## 2024-04-10 PROCEDURE — 6360000004 HC RX CONTRAST MEDICATION: Performed by: INTERNAL MEDICINE

## 2024-04-10 PROCEDURE — A9579 GAD-BASE MR CONTRAST NOS,1ML: HCPCS | Performed by: INTERNAL MEDICINE

## 2024-04-10 RX ORDER — SODIUM CHLORIDE 0.9 % (FLUSH) 0.9 %
10 SYRINGE (ML) INJECTION AS NEEDED
Status: DISCONTINUED | OUTPATIENT
Start: 2024-04-10 | End: 2024-04-14 | Stop reason: HOSPADM

## 2024-04-10 RX ADMIN — GADOTERIDOL 23 ML: 279.3 INJECTION, SOLUTION INTRAVENOUS at 06:58

## 2024-04-10 RX ADMIN — SODIUM CHLORIDE, PRESERVATIVE FREE 10 ML: 5 INJECTION INTRAVENOUS at 06:58

## 2024-04-11 ENCOUNTER — PATIENT MESSAGE (OUTPATIENT)
Dept: ENDOCRINOLOGY | Age: 59
End: 2024-04-11

## 2024-04-11 DIAGNOSIS — E26.09 PRIMARY HYPERALDOSTERONISM (HCC): Primary | ICD-10-CM

## 2024-04-12 RX ORDER — SPIRONOLACTONE 50 MG/1
50 TABLET, FILM COATED ORAL DAILY
Qty: 90 TABLET | Refills: 1 | Status: SHIPPED | OUTPATIENT
Start: 2024-04-12

## 2024-04-12 NOTE — TELEPHONE ENCOUNTER
From: Dr. Luis Angel Booker  To: Leslie Byrd  Sent: 4/11/2024 2:53 PM EDT  Subject: MRI review    Thank you for doing the MRI. It demonstrates that the spots in your adrenal gland are benign. That is obviously great news.    I also sent you another message a couple of weeks ago. I will reproduce that message for you.    Thank you for doing all of the lab testing. It took a few days to get everything back, but I now have the results needed. The only abnormality is that the aldosterone level is somewhat elevated. Aldosterone is a hormone made by the adrenal glands which regulates blood pressure and potassium balance. As you know, you have high blood pressure and have at times had low potassium as well. It is certainly possible that the spot on your adrenal gland is overproducing aldosterone. In that setting, there are two treatment options:     -First, I can send you to a surgeon and have that part of the adrenal gland removed. That surgery is typically done laparoscopically. That MIGHT make your blood pressure and potassium issues better.  -Second, I can prescribe you a medication which would counteract the aldosterone and help with both blood pressure and potassium balance. That medication is called spironolactone.     If you favor a surgical treatment, there is some additional testing that we would likely need to do to prove that the aldosterone is coming from that spot rather than equally from both adrenal glands (I would not want to send you to surgery unless I knew for certain that the spot on the adrenal gland was the culprit). If you are not interested in a surgical treatment, which I think is fine, I can get you started on the spironolactone (probably in place of the current blood pressure medication) and monitor things over time. If you prefer a surgical treatment, let me know and we can discuss the additional steps necessary.     If you have any questions, or if you would prefer that I call you to

## 2024-04-12 NOTE — TELEPHONE ENCOUNTER
My Chart message to patient:    I think that that is a great plan.  I will start spironolactone 50 mg/day.  I sent that prescription to your pharmacy on file.  Please come back to the lab in about 2 weeks and recheck a metabolic panel (so that I can monitor potassium).  Thanks.  Let me know if you have questions.

## 2024-08-21 ENCOUNTER — OFFICE VISIT (OUTPATIENT)
Dept: FAMILY MEDICINE CLINIC | Facility: CLINIC | Age: 59
End: 2024-08-21
Payer: COMMERCIAL

## 2024-08-21 VITALS
SYSTOLIC BLOOD PRESSURE: 126 MMHG | OXYGEN SATURATION: 98 % | HEIGHT: 62 IN | BODY MASS INDEX: 45.82 KG/M2 | DIASTOLIC BLOOD PRESSURE: 86 MMHG | HEART RATE: 82 BPM | WEIGHT: 249 LBS

## 2024-08-21 DIAGNOSIS — M79.2 NEUROPATHIC PAIN: Primary | ICD-10-CM

## 2024-08-21 DIAGNOSIS — E53.8 B12 DEFICIENCY: ICD-10-CM

## 2024-08-21 LAB
ALBUMIN SERPL-MCNC: 3.9 G/DL (ref 3.5–5)
ALBUMIN/GLOB SERPL: 1.3 (ref 1–1.9)
ALP SERPL-CCNC: 62 U/L (ref 35–104)
ALT SERPL-CCNC: 21 U/L (ref 12–65)
ANION GAP SERPL CALC-SCNC: 9 MMOL/L (ref 9–18)
AST SERPL-CCNC: 22 U/L (ref 15–37)
BILIRUB SERPL-MCNC: 0.5 MG/DL (ref 0–1.2)
BUN SERPL-MCNC: 21 MG/DL (ref 6–23)
CALCIUM SERPL-MCNC: 9.6 MG/DL (ref 8.8–10.2)
CHLORIDE SERPL-SCNC: 101 MMOL/L (ref 98–107)
CK SERPL-CCNC: 106 U/L (ref 21–215)
CO2 SERPL-SCNC: 31 MMOL/L (ref 20–28)
CREAT SERPL-MCNC: 1.11 MG/DL (ref 0.6–1.1)
ERYTHROCYTE [SEDIMENTATION RATE] IN BLOOD: 8 MM/HR (ref 0–30)
GLOBULIN SER CALC-MCNC: 3.1 G/DL (ref 2.3–3.5)
GLUCOSE SERPL-MCNC: 97 MG/DL (ref 70–99)
POTASSIUM SERPL-SCNC: 4.2 MMOL/L (ref 3.5–5.1)
PROT SERPL-MCNC: 7 G/DL (ref 6.3–8.2)
SODIUM SERPL-SCNC: 141 MMOL/L (ref 136–145)
VIT B12 SERPL-MCNC: 1155 PG/ML (ref 193–986)

## 2024-08-21 PROCEDURE — 3079F DIAST BP 80-89 MM HG: CPT | Performed by: FAMILY MEDICINE

## 2024-08-21 PROCEDURE — 99213 OFFICE O/P EST LOW 20 MIN: CPT | Performed by: FAMILY MEDICINE

## 2024-08-21 PROCEDURE — 3074F SYST BP LT 130 MM HG: CPT | Performed by: FAMILY MEDICINE

## 2024-08-21 RX ORDER — ASPIRIN 81 MG/1
81 TABLET ORAL DAILY
COMMUNITY

## 2024-08-21 SDOH — ECONOMIC STABILITY: FOOD INSECURITY: WITHIN THE PAST 12 MONTHS, YOU WORRIED THAT YOUR FOOD WOULD RUN OUT BEFORE YOU GOT MONEY TO BUY MORE.: NEVER TRUE

## 2024-08-21 SDOH — ECONOMIC STABILITY: FOOD INSECURITY: WITHIN THE PAST 12 MONTHS, THE FOOD YOU BOUGHT JUST DIDN'T LAST AND YOU DIDN'T HAVE MONEY TO GET MORE.: NEVER TRUE

## 2024-08-21 SDOH — ECONOMIC STABILITY: INCOME INSECURITY: HOW HARD IS IT FOR YOU TO PAY FOR THE VERY BASICS LIKE FOOD, HOUSING, MEDICAL CARE, AND HEATING?: NOT HARD AT ALL

## 2024-08-21 ASSESSMENT — PATIENT HEALTH QUESTIONNAIRE - PHQ9
SUM OF ALL RESPONSES TO PHQ QUESTIONS 1-9: 0
SUM OF ALL RESPONSES TO PHQ QUESTIONS 1-9: 0
SUM OF ALL RESPONSES TO PHQ9 QUESTIONS 1 & 2: 0
SUM OF ALL RESPONSES TO PHQ QUESTIONS 1-9: 0
1. LITTLE INTEREST OR PLEASURE IN DOING THINGS: NOT AT ALL
2. FEELING DOWN, DEPRESSED OR HOPELESS: NOT AT ALL
SUM OF ALL RESPONSES TO PHQ QUESTIONS 1-9: 0

## 2024-08-21 ASSESSMENT — ENCOUNTER SYMPTOMS: RESPIRATORY NEGATIVE: 1

## 2024-08-21 NOTE — PROGRESS NOTES
PROGRESS NOTE    SUBJECTIVE:   Leslie Byrd is a 59 y.o. female seen for a follow up visit regarding   Chief Complaint   Patient presents with    Arm Pain     Complains of bilateral upper arm pain x 2 weeks; denies any injury        HPI:  Very pleasant 59-year-old, [place] native returns to the office today reporting burning pain in both arms that has been going on for couple weeks.  No new activities, no trauma, no neck pain.  She denies other neurological symptoms.         Reviewed and updated this visit by provider:           Review of Systems   Respiratory: Negative.     Cardiovascular: Negative.           OBJECTIVE:  Vitals:    08/21/24 1104   BP: 126/86   Site: Left Upper Arm   Cuff Size: Large Adult   Pulse: 82   SpO2: 98%   Weight: 112.9 kg (249 lb)   Height: 1.575 m (5' 2.01\")        Physical Exam   General: Alert and oriented x3, well-appearing  Neck: No adenopathy, thyromegaly or thyroid nodules  Pulmonary: Normal effort, good airflow, no rales or rhonchi  CVS: Regular rate and rhythm, normal S1, S2, no S3 or S4, no murmurs; no carotid bruits, 2+ pedal pulses  Extremities: Upper extremities appear grossly normal.  There is no erythema or swelling.  She has good motor strength in both upper extremities, all groups.  There is no tenderness to palpation of the muscle bodies.    Medical problems and test results were reviewed with the patient today.     No results found for this or any previous visit (from the past 672 hour(s)).    No results found for any visits on 08/21/24.     ASSESSMENT and PLAN    1. Neuropathic pain, etiology unclear, compressive issue, metabolic?  Central, MS?  Autoimmune?  -     Comprehensive Metabolic Panel; Future  -     Sedimentation Rate; Future  -     C-Reactive Protein; Future  -     Aldolase; Future  -     CK; Future  -     Vitamin B12; Future  2. B12 deficiency  -     Vitamin B12; Future         No follow-ups on file.       Carlos Gomez MD

## 2024-08-23 LAB
ALDOLASE SERPL-CCNC: 5.6 U/L (ref 3.3–10.3)
CRP SERPL-MCNC: 9 MG/L (ref 0–10)

## 2024-09-17 ENCOUNTER — HOSPITAL ENCOUNTER (OUTPATIENT)
Dept: MAMMOGRAPHY | Age: 59
Discharge: HOME | End: 2024-09-20
Attending: FAMILY MEDICINE
Payer: COMMERCIAL

## 2024-09-17 ENCOUNTER — TRANSCRIBE ORDERS (OUTPATIENT)
Dept: SCHEDULING | Age: 59
End: 2024-09-17

## 2024-09-17 DIAGNOSIS — Z12.31 SCREENING MAMMOGRAM FOR HIGH-RISK PATIENT: ICD-10-CM

## 2024-09-17 DIAGNOSIS — Z12.31 SCREENING MAMMOGRAM FOR HIGH-RISK PATIENT: Primary | ICD-10-CM

## 2024-09-17 PROCEDURE — 77063 BREAST TOMOSYNTHESIS BI: CPT

## 2025-01-16 ENCOUNTER — OFFICE VISIT (OUTPATIENT)
Dept: FAMILY MEDICINE CLINIC | Facility: CLINIC | Age: 60
End: 2025-01-16
Payer: COMMERCIAL

## 2025-01-16 VITALS
HEART RATE: 63 BPM | BODY MASS INDEX: 46.38 KG/M2 | OXYGEN SATURATION: 94 % | DIASTOLIC BLOOD PRESSURE: 88 MMHG | WEIGHT: 252 LBS | SYSTOLIC BLOOD PRESSURE: 138 MMHG | HEIGHT: 62 IN

## 2025-01-16 DIAGNOSIS — I10 PRIMARY HYPERTENSION: Primary | ICD-10-CM

## 2025-01-16 DIAGNOSIS — M25.50 ARTHRALGIA, UNSPECIFIED JOINT: ICD-10-CM

## 2025-01-16 DIAGNOSIS — R53.82 CHRONIC FATIGUE: ICD-10-CM

## 2025-01-16 DIAGNOSIS — M79.10 MYALGIA: ICD-10-CM

## 2025-01-16 LAB
ALBUMIN SERPL-MCNC: 3.8 G/DL (ref 3.5–5)
ALBUMIN/GLOB SERPL: 1.2 (ref 1–1.9)
ALP SERPL-CCNC: 71 U/L (ref 35–104)
ALT SERPL-CCNC: 17 U/L (ref 8–45)
ANION GAP SERPL CALC-SCNC: 12 MMOL/L (ref 7–16)
AST SERPL-CCNC: 17 U/L (ref 15–37)
BASOPHILS # BLD: 0.03 K/UL (ref 0–0.2)
BASOPHILS NFR BLD: 0.5 % (ref 0–2)
BILIRUB SERPL-MCNC: 0.4 MG/DL (ref 0–1.2)
BUN SERPL-MCNC: 18 MG/DL (ref 6–23)
CALCIUM SERPL-MCNC: 9.3 MG/DL (ref 8.8–10.2)
CHLORIDE SERPL-SCNC: 101 MMOL/L (ref 98–107)
CK SERPL-CCNC: 78 U/L (ref 21–215)
CO2 SERPL-SCNC: 29 MMOL/L (ref 20–29)
CREAT SERPL-MCNC: 0.98 MG/DL (ref 0.6–1.1)
CRP SERPL-MCNC: 1.2 MG/DL (ref 0–0.4)
DIFFERENTIAL METHOD BLD: ABNORMAL
EOSINOPHIL # BLD: 0.1 K/UL (ref 0–0.8)
EOSINOPHIL NFR BLD: 1.7 % (ref 0.5–7.8)
ERYTHROCYTE [DISTWIDTH] IN BLOOD BY AUTOMATED COUNT: 12.9 % (ref 11.9–14.6)
ERYTHROCYTE [SEDIMENTATION RATE] IN BLOOD: 5 MM/HR (ref 0–30)
GLOBULIN SER CALC-MCNC: 3.2 G/DL (ref 2.3–3.5)
GLUCOSE SERPL-MCNC: 101 MG/DL (ref 70–99)
HCT VFR BLD AUTO: 41.2 % (ref 35.8–46.3)
HGB BLD-MCNC: 13.1 G/DL (ref 11.7–15.4)
IMM GRANULOCYTES # BLD AUTO: 0.04 K/UL (ref 0–0.5)
IMM GRANULOCYTES NFR BLD AUTO: 0.7 % (ref 0–5)
LYMPHOCYTES # BLD: 1.49 K/UL (ref 0.5–4.6)
LYMPHOCYTES NFR BLD: 25.7 % (ref 13–44)
MCH RBC QN AUTO: 26.3 PG (ref 26.1–32.9)
MCHC RBC AUTO-ENTMCNC: 31.8 G/DL (ref 31.4–35)
MCV RBC AUTO: 82.7 FL (ref 82–102)
MONOCYTES # BLD: 0.32 K/UL (ref 0.1–1.3)
MONOCYTES NFR BLD: 5.5 % (ref 4–12)
NEUTS SEG # BLD: 3.82 K/UL (ref 1.7–8.2)
NEUTS SEG NFR BLD: 65.9 % (ref 43–78)
NRBC # BLD: 0 K/UL (ref 0–0.2)
PLATELET # BLD AUTO: 176 K/UL (ref 150–450)
PMV BLD AUTO: 13.8 FL (ref 9.4–12.3)
POTASSIUM SERPL-SCNC: 4.2 MMOL/L (ref 3.5–5.1)
PROT SERPL-MCNC: 6.9 G/DL (ref 6.3–8.2)
RBC # BLD AUTO: 4.98 M/UL (ref 4.05–5.2)
SODIUM SERPL-SCNC: 142 MMOL/L (ref 136–145)
TSH, 3RD GENERATION: 0.39 UIU/ML (ref 0.27–4.2)
VIT B12 SERPL-MCNC: 1124 PG/ML (ref 193–986)
WBC # BLD AUTO: 5.8 K/UL (ref 4.3–11.1)

## 2025-01-16 PROCEDURE — 99214 OFFICE O/P EST MOD 30 MIN: CPT | Performed by: FAMILY MEDICINE

## 2025-01-16 PROCEDURE — 3075F SYST BP GE 130 - 139MM HG: CPT | Performed by: FAMILY MEDICINE

## 2025-01-16 PROCEDURE — 3079F DIAST BP 80-89 MM HG: CPT | Performed by: FAMILY MEDICINE

## 2025-01-16 SDOH — ECONOMIC STABILITY: FOOD INSECURITY: WITHIN THE PAST 12 MONTHS, YOU WORRIED THAT YOUR FOOD WOULD RUN OUT BEFORE YOU GOT MONEY TO BUY MORE.: NEVER TRUE

## 2025-01-16 SDOH — ECONOMIC STABILITY: FOOD INSECURITY: WITHIN THE PAST 12 MONTHS, THE FOOD YOU BOUGHT JUST DIDN'T LAST AND YOU DIDN'T HAVE MONEY TO GET MORE.: NEVER TRUE

## 2025-01-16 ASSESSMENT — PATIENT HEALTH QUESTIONNAIRE - PHQ9
2. FEELING DOWN, DEPRESSED OR HOPELESS: NOT AT ALL
SUM OF ALL RESPONSES TO PHQ QUESTIONS 1-9: 0
SUM OF ALL RESPONSES TO PHQ QUESTIONS 1-9: 0
1. LITTLE INTEREST OR PLEASURE IN DOING THINGS: NOT AT ALL
SUM OF ALL RESPONSES TO PHQ QUESTIONS 1-9: 0
SUM OF ALL RESPONSES TO PHQ QUESTIONS 1-9: 0
SUM OF ALL RESPONSES TO PHQ9 QUESTIONS 1 & 2: 0

## 2025-01-16 ASSESSMENT — ENCOUNTER SYMPTOMS: RESPIRATORY NEGATIVE: 1

## 2025-01-16 NOTE — PROGRESS NOTES
PROGRESS NOTE    SUBJECTIVE:   Leslie Byrd is a 59 y.o. female seen for a follow up visit regarding   Chief Complaint   Patient presents with    Fatigue     Complains of body aches x 2 weeks          HPI:  Here today for follow-up of chronic problems.  She reports that she has been having some bodyaches, primarily upper extremities and neck area.  There is been no trauma, no new activities.  No recent illnesses.         Reviewed and updated this visit by provider:           Review of Systems   Respiratory: Negative.     Cardiovascular: Negative.           OBJECTIVE:  Vitals:    01/16/25 1110   BP: 138/88   Site: Left Upper Arm   Cuff Size: Medium Adult   Pulse: 63   SpO2: 94%   Weight: 114.3 kg (252 lb)   Height: 1.575 m (5' 2.01\")        Physical Exam   General: Alert and oriented x3, well-appearing  Neck: No adenopathy, thyromegaly or thyroid nodules  Pulmonary: Normal effort, good airflow, no rales or rhonchi  CVS: Regular rate and rhythm, normal S1, S2, no S3 or S4, no murmurs; no carotid bruits, 2+ pedal pulses  Musculoskeletal: Good range of motion of the cervical spine and shoulders, C5-C8 motor exam is intact and without deficit, good strength.  No muscle tenderness.    Medical problems and test results were reviewed with the patient today.     No results found for this or any previous visit (from the past 672 hour(s)).    No results found for any visits on 01/16/25.     ASSESSMENT and PLAN    1. Chronic fatigue  -     CBC with Auto Differential; Future  -     Comprehensive Metabolic Panel; Future  -     Vitamin B12; Future  -     TSH; Future  2. Primary hypertension, at goal  -     Comprehensive Metabolic Panel; Future  3. Myalgia  -     CK; Future  4. Arthralgia, unspecified joint  -     C-Reactive Protein; Future  -     Sedimentation Rate; Future  -     MARSHALL, Direct, w/Reflex; Future  -     Rheumatoid Factor; Future     Will advise patient to stop the Aldactone and continue with Maxide.    No

## 2025-01-17 ENCOUNTER — TELEPHONE (OUTPATIENT)
Dept: FAMILY MEDICINE CLINIC | Facility: CLINIC | Age: 60
End: 2025-01-17

## 2025-01-17 LAB
ANA SER QL: NEGATIVE
RHEUMATOID FACT SER QL LA: POSITIVE
RHEUMATOID FACT TITR SER LA: NORMAL {TITER}

## 2025-01-17 NOTE — TELEPHONE ENCOUNTER
Patient notified of message from provider. Will d/c Aldactone. Will hold off on starting Losartan at this time due to the fact that BP is not elevated often. Will monitor BP at home and if BP continues to be elevated will call back to start Losartan.      Please d/c Aldactone from chart.

## 2025-02-04 ENCOUNTER — PROCEDURE VISIT (OUTPATIENT)
Dept: NEUROLOGY | Age: 60
End: 2025-02-04
Payer: COMMERCIAL

## 2025-02-04 VITALS — HEIGHT: 62 IN | OXYGEN SATURATION: 98 % | BODY MASS INDEX: 46.01 KG/M2 | HEART RATE: 72 BPM | WEIGHT: 250 LBS

## 2025-02-04 DIAGNOSIS — R20.2 PARESTHESIA OF BOTH HANDS: Primary | ICD-10-CM

## 2025-02-04 PROCEDURE — 95913 NRV CNDJ TEST 13/> STUDIES: CPT | Performed by: PSYCHIATRY & NEUROLOGY

## 2025-02-04 PROCEDURE — 95885 MUSC TST DONE W/NERV TST LIM: CPT | Performed by: PSYCHIATRY & NEUROLOGY

## 2025-02-04 NOTE — PROGRESS NOTES
EMG/Nerve Conduction Study Procedure Note  [address] [facility]    [address]  [place]  [address]   [phone]      Hx:    Exam:     60 y.o.  b/aa female     EMG::    BUE.  Paresthesia hands.  Probably CTS.  Some neck pain intermittent.  No Tutu no Carmenza.  No Spurling no Lhermitte sign.  Equivocal Tinel.  No atrophy.  Referring:    Dr. Carlos Gomez  Technologist ::   Gideon Krishnan  Hgt:      5 feet 2 inches        Summary      needle EMG uppers with CV studies.                 Controlled environmental factors / EMG lab.  Temperature.   NCV : sensory segments:         Abnormal = bilateral median nerve SCV slowing.  Normal bilateral ulnar bilateral radial SCV.  NCV transcarpal sensory segments:     Abnormal = slowed bilateral median transcarpal segments with peak difference of latency on the right at 0.58 ms (UL = 0.20 ms); left side 0.77 ms.     NCV Motor MCV segments:        Normal bilateral median bilateral ulnar MCV.     F-wave studies:        Normal bilateral median ulnar F waves.   NEEDLE EMG:   Tested muscles::     Normal bilateral FDI APB muscle testing.               INTERPRETATION:      COMPATIBLE ELECTROPHYSIOLOGIC TESTING FINDINGS REVEALING MILD BILATERAL ENTRAPMENT OF THE MEDIAN NERVES AT THE WRISTS.  NO OTHER NEUROPATHY.  NO AXONAL DENERVATION.           CONCLUSION:    Bilateral mild carpal tunnel syndromes.                Procedure Details:      Correlates with clinical history examination.  Conservative therapy discussed.    Patient made fully aware.              Please Note::     Data and waveforms * filed under Procedure.    See Procedure Files for data pages.       [ *NOTE:  parts or all of this report are produced using artificial voice recognition software.  Some speech errors are inherent in such software and may be included in the produced record. ]           Frank Coffman MD  [facility]ative  Neurodiagnostics   [facility] [address]

## 2025-02-11 ENCOUNTER — OFFICE VISIT (OUTPATIENT)
Dept: ENT CLINIC | Age: 60
End: 2025-02-11
Payer: COMMERCIAL

## 2025-02-11 VITALS — RESPIRATION RATE: 16 BRPM | BODY MASS INDEX: 46.01 KG/M2 | WEIGHT: 250 LBS | HEIGHT: 62 IN

## 2025-02-11 DIAGNOSIS — R13.14 PHARYNGOESOPHAGEAL DYSPHAGIA: Primary | ICD-10-CM

## 2025-02-11 PROCEDURE — 99244 OFF/OP CNSLTJ NEW/EST MOD 40: CPT | Performed by: STUDENT IN AN ORGANIZED HEALTH CARE EDUCATION/TRAINING PROGRAM

## 2025-02-11 PROCEDURE — 31575 DIAGNOSTIC LARYNGOSCOPY: CPT | Performed by: STUDENT IN AN ORGANIZED HEALTH CARE EDUCATION/TRAINING PROGRAM

## 2025-02-11 ASSESSMENT — ENCOUNTER SYMPTOMS
SINUS PRESSURE: 0
APNEA: 0
CHOKING: 0
EYE ITCHING: 0
COUGH: 0
NAUSEA: 0
CONSTIPATION: 0
STRIDOR: 0
DIARRHEA: 0
FACIAL SWELLING: 0
SHORTNESS OF BREATH: 0
WHEEZING: 0
SINUS PAIN: 0
EYE PAIN: 0
TROUBLE SWALLOWING: 1
EYE DISCHARGE: 0

## 2025-02-11 NOTE — PROGRESS NOTES
01/16/2025 11:42 AM    CREATININE 0.98 01/16/2025 11:42 AM    GLUCOSE 101 01/16/2025 11:42 AM    CALCIUM 9.3 01/16/2025 11:42 AM    LABGLOM 67 01/16/2025 11:42 AM    LABGLOM >60 02/15/2024 08:07 AM       Latest Reference Range & Units 01/16/25 11:42   CRP 0.0 - 0.4 mg/dL 1.2 (H)   (H): Data is abnormally high     Latest Reference Range & Units 01/16/25 11:42   MARSHALL Negative   Negative   Rheumatoid Factor Negative   Negative   1:8  Positive !   !: Data is abnormal    ASSESSMENT and PLAN  Normal exam today including laryngoscopy.  If issue recurs I will consider ordering a modified barium swallow for her.  Would also consider referral to GI for EGD with biopsy for evaluation of possible eosinophilic esophagitis.    ICD-10-CM    1. Pharyngoesophageal dysphagia  R13.14 LARYNGOSCOPY,FLEX FIBER,DIAGNOSTIC              Saud Fleming MD  2/11/2025  Electronically signed    Note dictated using voice recognition software.  Please excuse any typos.

## 2025-03-11 DIAGNOSIS — I10 PRIMARY HYPERTENSION: ICD-10-CM

## 2025-03-11 RX ORDER — TRIAMTERENE AND HYDROCHLOROTHIAZIDE 75; 50 MG/1; MG/1
TABLET ORAL
Qty: 90 TABLET | Refills: 3 | Status: CANCELLED | OUTPATIENT
Start: 2025-03-11

## 2025-03-11 NOTE — TELEPHONE ENCOUNTER
Patient states she accidentally sent MyChart refill to our office but her PCP Dr. Carlos Gomez is following her Maxzide. I provided her their number [phone] and she will reach out to them for refill. Patient has not been seen in one year and will need follow up with Dr. Zapata if needing anything further. Patient verbalized understanding and appreciated the help.

## 2025-03-13 ENCOUNTER — TELEPHONE (OUTPATIENT)
Dept: FAMILY MEDICINE CLINIC | Facility: CLINIC | Age: 60
End: 2025-03-13

## 2025-03-13 DIAGNOSIS — G47.33 OSA (OBSTRUCTIVE SLEEP APNEA): ICD-10-CM

## 2025-03-13 DIAGNOSIS — I10 PRIMARY HYPERTENSION: ICD-10-CM

## 2025-03-13 DIAGNOSIS — I44.0 FIRST DEGREE AV BLOCK: ICD-10-CM

## 2025-03-13 RX ORDER — TRIAMTERENE AND HYDROCHLOROTHIAZIDE 75; 50 MG/1; MG/1
TABLET ORAL
Qty: 90 TABLET | Refills: 3 | Status: SHIPPED | OUTPATIENT
Start: 2025-03-13

## 2025-03-13 NOTE — TELEPHONE ENCOUNTER
Patient called requesting refill on triamterene-hydroCHLOROthiazide (MAXZIDE) 75-50 MG per tablet. [facility] on [address] in [place].    Patient also needing Rx for CPAP supplies to be faxed to [facility].    Please advise.

## 2025-03-14 NOTE — TELEPHONE ENCOUNTER
Rx given to Dr. Gomez to sign, then faxed over to [facility].

## 2025-08-26 ENCOUNTER — TRANSCRIBE ORDERS (OUTPATIENT)
Dept: SCHEDULING | Age: 60
End: 2025-08-26

## 2025-08-26 DIAGNOSIS — Z12.31 ENCOUNTER FOR SCREENING MAMMOGRAM FOR MALIGNANT NEOPLASM OF BREAST: Primary | ICD-10-CM

## (undated) DEVICE — SNARE POLYP SM W13MMXL240CM SHTH DIA2.4MM OVL FLX DISP

## (undated) DEVICE — KENDALL RADIOLUCENT FOAM MONITORING ELECTRODE RECTANGULAR SHAPE: Brand: KENDALL

## (undated) DEVICE — SYRINGE, LUER SLIP, STERILE, 60ML: Brand: MEDLINE

## (undated) DEVICE — SYRINGE MED 10ML LUERLOCK TIP W/O SFTY DISP

## (undated) DEVICE — YANKAUER,BULB TIP,W/O VENT,RIGID,STERILE: Brand: MEDLINE

## (undated) DEVICE — TRAP SPEC POLYP REM STRNR CLN DSGN MAGNIFYING WIND DISP

## (undated) DEVICE — LUBE JELLY FOIL PACK 1.4 OZ: Brand: MEDLINE INDUSTRIES, INC.

## (undated) DEVICE — SYRINGE MED 3ML CLR PLAS STD N CTRL LUERLOCK TIP DISP

## (undated) DEVICE — SINGLE PORT MANIFOLD: Brand: NEPTUNE 2

## (undated) DEVICE — CONNECTOR TBNG OD5-7MM O2 END DISP

## (undated) DEVICE — NEEDLE SYR 18GA L1.5IN RED PLAS HUB S STL BLNT FILL W/O

## (undated) DEVICE — GAUZE,SPONGE,4"X4",12PLY,WOVEN,NS,LF: Brand: MEDLINE

## (undated) DEVICE — CANNULA NSL ORAL AD FOR CAPNOFLEX CO2 O2 AIRLFE

## (undated) DEVICE — CONTAINER FORMALIN PREFILLED 10% NBF 60ML

## (undated) DEVICE — AIRLIFE™ OXYGEN TUBING 7 FEET (2.1 M) CRUSH RESISTANT OXYGEN TUBING, VINYL TIPPED: Brand: AIRLIFE™